# Patient Record
Sex: FEMALE | Race: BLACK OR AFRICAN AMERICAN | URBAN - METROPOLITAN AREA
[De-identification: names, ages, dates, MRNs, and addresses within clinical notes are randomized per-mention and may not be internally consistent; named-entity substitution may affect disease eponyms.]

---

## 2024-02-29 ENCOUNTER — OFFICE VISIT (OUTPATIENT)
Dept: OBGYN CLINIC | Facility: CLINIC | Age: 44
End: 2024-02-29
Payer: COMMERCIAL

## 2024-02-29 VITALS
BODY MASS INDEX: 30.34 KG/M2 | DIASTOLIC BLOOD PRESSURE: 78 MMHG | SYSTOLIC BLOOD PRESSURE: 120 MMHG | HEIGHT: 61 IN | WEIGHT: 160.69 LBS

## 2024-02-29 DIAGNOSIS — R39.9 URINARY SYMPTOM OR SIGN: ICD-10-CM

## 2024-02-29 DIAGNOSIS — Z92.0 HISTORY OF USE OF CONTRACEPTIVE INTRAUTERINE DEVICE (IUD): ICD-10-CM

## 2024-02-29 DIAGNOSIS — D21.9 FIBROIDS: ICD-10-CM

## 2024-02-29 DIAGNOSIS — Z01.419 WOMEN'S ANNUAL ROUTINE GYNECOLOGICAL EXAMINATION: ICD-10-CM

## 2024-02-29 DIAGNOSIS — Z12.31 BREAST CANCER SCREENING BY MAMMOGRAM: ICD-10-CM

## 2024-02-29 DIAGNOSIS — N92.4 EXCESSIVE BLEEDING IN PREMENOPAUSAL PERIOD: ICD-10-CM

## 2024-02-29 DIAGNOSIS — Z12.4 SCREENING FOR CERVICAL CANCER: Primary | ICD-10-CM

## 2024-02-29 LAB
APPEARANCE: CLEAR
BILIRUBIN: NEGATIVE
GLUCOSE (URINE DIPSTICK): NEGATIVE MG/DL
KETONES (URINE DIPSTICK): NEGATIVE MG/DL
LEUKOCYTES: NEGATIVE
MULTISTIX LOT#: ABNORMAL NUMERIC
NITRITE, URINE: NEGATIVE
PH, URINE: 6 (ref 4.5–8)
PROTEIN (URINE DIPSTICK): NEGATIVE MG/DL
SPECIFIC GRAVITY: 1.01 (ref 1–1.03)
URINE-COLOR: YELLOW
UROBILINOGEN,SEMI-QN: 0.2 MG/DL (ref 0–1.9)

## 2024-02-29 PROCEDURE — 99386 PREV VISIT NEW AGE 40-64: CPT | Performed by: OBSTETRICS & GYNECOLOGY

## 2024-02-29 PROCEDURE — 81002 URINALYSIS NONAUTO W/O SCOPE: CPT | Performed by: OBSTETRICS & GYNECOLOGY

## 2024-02-29 PROCEDURE — 99205 OFFICE O/P NEW HI 60 MIN: CPT | Performed by: OBSTETRICS & GYNECOLOGY

## 2024-02-29 PROCEDURE — 87624 HPV HI-RISK TYP POOLED RSLT: CPT | Performed by: OBSTETRICS & GYNECOLOGY

## 2024-02-29 PROCEDURE — 87086 URINE CULTURE/COLONY COUNT: CPT | Performed by: OBSTETRICS & GYNECOLOGY

## 2024-02-29 RX ORDER — SUMATRIPTAN 100 MG/1
100 TABLET, FILM COATED ORAL EVERY 2 HOUR PRN
Refills: 0 | Status: CANCELLED | OUTPATIENT
Start: 2024-02-29

## 2024-02-29 RX ORDER — ALBUTEROL SULFATE 90 UG/1
1 AEROSOL, METERED RESPIRATORY (INHALATION) AS DIRECTED
COMMUNITY

## 2024-02-29 RX ORDER — BUTALBITAL, ACETAMINOPHEN AND CAFFEINE 50; 325; 40 MG/1; MG/1; MG/1
TABLET ORAL
Refills: 0 | Status: CANCELLED | OUTPATIENT
Start: 2024-02-29

## 2024-02-29 RX ORDER — TRANEXAMIC ACID 650 MG/1
TABLET ORAL
Qty: 30 TABLET | Refills: 3 | Status: SHIPPED | OUTPATIENT
Start: 2024-02-29

## 2024-02-29 RX ORDER — SUMATRIPTAN 100 MG/1
100 TABLET, FILM COATED ORAL EVERY 2 HOUR PRN
COMMUNITY

## 2024-02-29 RX ORDER — BUTALBITAL, ACETAMINOPHEN AND CAFFEINE 50; 325; 40 MG/1; MG/1; MG/1
TABLET ORAL
COMMUNITY
Start: 2023-01-03

## 2024-02-29 NOTE — PROGRESS NOTES
NEW GYN H&P     2024  4:52 PM    Chief Complaint   Patient presents with    New Patient     Pt here for follow up from [facility] 2024    Annual     Annual exam/pap     Vaginal Bleeding     Pt c/o irregular periods last period - and -    Migraine     Pt c/o migraine headaches on periods    .    HPI: Patient is a 43 year old  LMP 23 here to establish care as ER follow-up also  due for PAP and annual exam. No gynecologic visit or PAP since  when IUD placed. Seen at [facility] 2024 for pelvic pain and heavy periods where underwent USN showing 24 cm enlarged uterus with multiple large fibroids measuring up to 8.1 cm. Counseled that no minimally invasive procedures or medications are an option for such large fibroids and that hysterectomy is the indicated treatment. Will check CBC today and prescribe TXA for heavy flow until able to schedule pre-op appointment with my partner Dr. Gaines. Fearful of exam and pain today but agrees to PAP and if possible, IUD removal.    Patient's last menstrual period was 2023.    OB History    Para Term  AB Living   1       1     SAB IAB Ectopic Multiple Live Births                  # Outcome Date GA Lbr Zeeshan/2nd Weight Sex Delivery Anes PTL Lv   1 AB                GYN hx:    Hx Prior Abnormal Pap: No  Pap Result Notes: per pt last pap done [id number]  Follow Up Recommendation: Mammo: Never  CONTRACEPTION: IUD  LAST MAMMOGRAM: Never      Current Outpatient Medications   Medication Sig Dispense Refill    albuterol 108 (90 Base) MCG/ACT Inhalation Aero Soln Inhale 1 puff into the lungs As Directed.      Levonorgestrel 20 MCG/DAY Intrauterine IUD 1 Intra Uterine Device by Intrauterine route one time.      SUMAtriptan Succinate 100 MG Oral Tab Take 1 tablet (100 mg total) by mouth every 2 (two) hours as needed for Migraine.      butalbital-acetaminophen-caffeine -40 MG Oral Tab TAKE 1 TABLET BY MOUTH EVERY 4 HOURS AS  NEEDED FOR PAIN (headaches) (Patient not taking: Reported on 2/29/2024)         Past Medical History:   Diagnosis Date    Fibroids 2/29/2024    History of use of contraceptive intrauterine device (IUD) 2014    Mirena IUD    Hypertension     Migraine      Past Surgical History:   Procedure Laterality Date    D & C  2014    EAB    INSERT INTRAUTERINE DEVICE  2014    Mirena     No Known Allergies  Family History   Problem Relation Age of Onset    Breast Cancer Neg     Ovarian Cancer Neg     Uterine Cancer Neg     Colon Cancer Neg      Social History     Socioeconomic History    Marital status: Single   Tobacco Use    Smoking status: Never    Smokeless tobacco: Never   Vaping Use    Vaping Use: Never used   Substance and Sexual Activity    Alcohol use: Yes    Drug use: Never    Sexual activity: Yes     Partners: Male     Birth control/protection: Mirena     Comment: MIrena IUD 2014/2015     Social History     Social History Narrative    Not on file       ROS:     Review of Systems:  A comprehensive 10 point ROS was completed. All pertinent positives and negatives noted in the HPI.     /78   Ht 61\"   Wt 160 lb 11.2 oz (72.9 kg)   LMP 01/14/2023   BMI 30.36 kg/m²     Exam:   GENERAL: well developed, well nourished, in no apparent distress  SKIN: no rashes, no lesions  HEENT: normal  LUNGS: respiration unlabored  CARDIOVASCULAR: no peripheral edema or varicosities, skin warm and dry  BREASTS: bilaterally nontender, no palpable masses, no nipple discharge, no skin changes, no axillary adenopathy  ABDOMEN: Uterine fundus at xiphoid process extending bilaterally under ribs  GYNE/:   External Genitalia: normal, no lesions  Urethra: meatus normal   Bladder: well supported  Vagina: extreme vaginismus, no visible lesions, tan discharge noted in vault  Uterus: 25 cm, globular extending to posterior vault, immobile  Cervix: unable to visualize os or IUD string, PAP brush applied posteriorally for sampling  Adnexa:  unable to palpate  Cul-de-sac: normal  R/V: normal perineum, no hemorrhoids  EXTREMITIES:  nontender without edema      A/P: Patient is 43 year old female     1. Women's annual routine gynecological examination  - PAP today  - Mammogram ordered    2. Fibroids 25 cm uterus  - Return for pre-op counseling with Dr. Gaines    3. Excessive bleeding in premenopausal period  - Check CBC  - TXA ordered    4. History of use of contraceptive intrauterine device (IUD)  - Unable to visualize  - For removal at time of hysterectomy        Total time spent = 60 minutes  >50% visit = face to face counseling and coordination of care        2/29/2024  Kristal Soto MD

## 2024-03-01 LAB — HPV I/H RISK 1 DNA SPEC QL NAA+PROBE: NEGATIVE

## 2024-04-03 ENCOUNTER — TELEPHONE (OUTPATIENT)
Dept: OBGYN CLINIC | Facility: CLINIC | Age: 44
End: 2024-04-03

## 2024-05-02 ENCOUNTER — TELEPHONE (OUTPATIENT)
Dept: OBGYN CLINIC | Facility: CLINIC | Age: 44
End: 2024-05-02

## 2024-07-16 RX ORDER — TRANEXAMIC ACID 650 MG/1
TABLET ORAL
Qty: 30 TABLET | Refills: 3 | Status: SHIPPED | OUTPATIENT
Start: 2024-07-16

## 2024-11-07 ENCOUNTER — PATIENT MESSAGE (OUTPATIENT)
Dept: OBGYN CLINIC | Facility: CLINIC | Age: 44
End: 2024-11-07

## 2024-11-08 RX ORDER — TRANEXAMIC ACID 650 MG/1
TABLET ORAL
Qty: 30 TABLET | Refills: 1 | Status: SHIPPED | OUTPATIENT
Start: 2024-11-08 | End: 2025-01-23

## 2024-12-05 ENCOUNTER — TELEPHONE (OUTPATIENT)
Dept: OBGYN CLINIC | Facility: CLINIC | Age: 44
End: 2024-12-05

## 2024-12-05 ENCOUNTER — OFFICE VISIT (OUTPATIENT)
Dept: OBGYN CLINIC | Facility: CLINIC | Age: 44
End: 2024-12-05
Payer: COMMERCIAL

## 2024-12-05 VITALS
HEIGHT: 61 IN | BODY MASS INDEX: 30.78 KG/M2 | SYSTOLIC BLOOD PRESSURE: 150 MMHG | WEIGHT: 163 LBS | DIASTOLIC BLOOD PRESSURE: 80 MMHG

## 2024-12-05 DIAGNOSIS — N92.4 EXCESSIVE BLEEDING IN PREMENOPAUSAL PERIOD: ICD-10-CM

## 2024-12-05 DIAGNOSIS — D21.9 FIBROIDS: Primary | ICD-10-CM

## 2024-12-05 DIAGNOSIS — D21.9 FIBROIDS: ICD-10-CM

## 2024-12-05 DIAGNOSIS — Z12.31 BREAST CANCER SCREENING BY MAMMOGRAM: Primary | ICD-10-CM

## 2024-12-05 PROBLEM — Z01.419 WOMEN'S ANNUAL ROUTINE GYNECOLOGICAL EXAMINATION: Status: RESOLVED | Noted: 2024-02-29 | Resolved: 2024-12-05

## 2024-12-05 PROCEDURE — 99214 OFFICE O/P EST MOD 30 MIN: CPT | Performed by: OBSTETRICS & GYNECOLOGY

## 2024-12-05 NOTE — TELEPHONE ENCOUNTER
Scheduled 1/23 1 pm    Please schedule the following surgery:    Procedure: 1/23/2025 abdominal hysterectomy, bilateral salpingectomy  Assist: (Y/N or none) yes, possible [place]yel.   Date:                                      Time Requested:   Dx:  Pre-op appt: (Y/N or n/a)  Admission type: (IN/OUT/OBVS)  Department of discharge(SDS/Floor):  Expected length of stay:  Procedure length time (please enter amount you are requesting):  Recovery time (patients always ask):  Medical Clearance: (Y/N)  Post- Op f/u appt time frame:     Pre-op orders (choose one)   X Use [facility] procedure driven order set in addition to anesthesia protocol   Use [facility] surgeon's personalized order set   Surgeon to enter pre-op orders   No pre-op orders   Use the prophylactic antibiotic protocol   No pre-op antibiotic orders indicated do not give antibiotic, if any, listed on the procedure driven order sets or personalized order sets    PCN allergy Yes___ or No___   If Yes: Proceed with PCN/Cephalosporin recommended antibiotic as benefit outweighs risk     Proceed with PCN/Cephalosporin recommended antibiotic as not a true allergy    Proceed with recommended alternative antibiotic for PCN allergy        ALL Medicaid/including BCBS community: Tubal/ Hyst form MUST be signed (30 days):      Message to nurses:

## 2024-12-05 NOTE — PROGRESS NOTES
GYN H&P     2024  11:10 AM    CC: Patient is here for fibroid uterus    HPI: Patient is a 44 year old  for fibroid uterus    Has periods which are long, heavy, passing clots, anemia \"for years.\" She has been delaying getting surgery for years. Some cramping with periods. No endometrial biopsy      Patient's last menstrual period was 2024.    OB History    Para Term  AB Living   1       1     SAB IAB Ectopic Multiple Live Births                  # Outcome Date GA Lbr Zeeshan/2nd Weight Sex Type Anes PTL Lv   1 AB                GYN hx:             Past Medical History:    Fibroids    Hypertension    Migraine     Past Surgical History:   Procedure Laterality Date    D & c      EAB    Insert intrauterine device      Mirena     Allergies[1]  Family History   Problem Relation Age of Onset    No Known Problems Mother     No Known Problems Father     No Known Problems Sister     High Blood Pressure Maternal Grandmother     Heart Disease Maternal Grandmother     Kidney Disease Maternal Grandfather         dialysis    High Blood Pressure Maternal Grandfather     Breast Cancer Neg     Ovarian Cancer Neg     Uterine Cancer Neg     Colon Cancer Neg      Social History     Socioeconomic History    Marital status: Single   Occupational History    Occupation: works from home as    Tobacco Use    Smoking status: Never    Smokeless tobacco: Never   Vaping Use    Vaping status: Never Used   Substance and Sexual Activity    Alcohol use: Yes    Drug use: Never    Sexual activity: Yes     Partners: Male     Birth control/protection: Mirena     Comment: MIrena IUD      Social History     Social History Narrative    Lives with mother and GM, feels safe    No hx of abuse       Medications reviewed. See active list.     /80   Ht 61\"   Wt 163 lb (73.9 kg)   LMP 2024   BMI 30.80 kg/m²       Exam:   GENERAL: well developed, well nourished, in no apparent  distress  SKIN: no rashes, no suspicious lesions  HEENT: normal  NECK: supple; no thyroidmegaly, no adenopathy  BREASTS: symmetrical, nontender, no palpable masses or nodes, no nipple discharge, no skin changes, no dippling, no palpable axillary adenopathy  ABDOMEN: large abdominal mass to ribs  Liver and spleen non-tender, no enlargement. No palpable hernias  GYNE/:  External Genitalia: Normal appearing, no lesions, normal hair distribution   Urethral meatus appear wnl, no abnormal discharge or lesions noted.   Bladder: well supported, urethra wnl, no palpable tenderness or masses, no discharge  Vagina: normal pink mucosa, no lesions, normal clear discharge.   Uterus: 28 W size fibroid uterus  Cervix: pink, no lesions grossly visible, no discharge  Adnexa: non tender, no palpable masses, normal size  Anus:  No lesions or visible hemorrhoids  Impression    1. Limited evaluation due to patient declining transvaginal approach, and multiple uterine fibroids obscuring view of adnexa.  2. Multiple uterine fibroids.  3. Bilateral ovaries were not assessed, due to above limitations.    ATTENDING ADDENDUM: I agree with the above report with the following additions. As above, the uterus is markedly enlarged with numerous uterine fibroids, correlating with findings on recent CT. The markedly enlarged uterus and fibroids contribute to the limited visualization given poor acoustic windows. Absence of transvaginal imaging also limits evaluation. In the region of the mid body/lower uterine segment, a linear, echogenic, shadowing structure is present, correlating with the patient's known intrauterine device. Evaluation of the endometrium and localization of the intrauterine device is somewhat limited given poor acoustic windows and distortion from the numerous fibroids. As above, the ovaries are not discretely visualized. Further evaluation based on clinical findings, which could include an MRI of the pelvis with and without IV  contrast, if further characterization is desired/warranted. Other findings as above.    I, Dr. Jesus Egan, have personally reviewed this report and concur with its findings and conclusions, as affirmed by my electronic signature.    Resident/Fellow: Efrain Doss on 01/29/2024 2:33 PM    Attending: Jesus Egan on 01/29/2024 3:04 PM  Narrative    EXAM:  US PELVIS TRANSABDOMINAL NON-OB COMPLETE; US TRANSVAGINAL; VAS DUPLEX ABDOMEN, PELVIS, SCROTUM, OR RETROPERITONEUM COMPLETE,  1/29/2024 1:40 pm; 1/29/2024 1:41 pm    HISTORY:  Pelvic pain.    COMPARISON:  Limited comparison with recent CT abdomen and pelvis    TECHNIQUE:  Multiple sonographic images were obtained of the pelvis via transabdominal approaches. A duplex Doppler study was performed consisting of integrated two-dimensional real-time imaging with color arterial and venous flow Doppler and Doppler spectral analysis utilizing high frequency linear array probe. Permanent images were obtained.    FINDINGS:  Limited evaluation as patient declined transvaginal exam, and multiple uterine fibroids obscuring view of the bilateral adnexa.    Transabdominal imaging demonstrate the anteverted uterus is heterogenous and enlarged measuring 24.0 X 9.9 x 1.7 cm. Multiple uterine fibroids, as follows:  Pedunculated fundal fibroid measuring 6.5 x 7.2 x 6.1 cm.  Intramural right lateral wall fibroid measuring 8.0 x 8.1 x 7.9 cm.  Intramural left lateral wall fibroid measuring 6.1 x 5.1 x 4.5 cm.  Intramural posterior fibroid measuring 4.5 x 4.4 x 4.2 cm    Urinary bladder is underdistended and unremarkable.    A portion of the endometrium is visualized measuring 6-7 mm in thickness. There is no evidence of free fluid within the posterior cul-de-sac.    The bilateral ovaries were not visualized, likely related to poor acoustic windows/shadowing.  Procedure Note    Jesus Egan MD - 01/29/2024  Formatting of this note might be different from the  original.  EXAM:  US PELVIS TRANSABDOMINAL NON-OB COMPLETE; US TRANSVAGINAL; VAS DUPLEX ABDOMEN, PELVIS, SCROTUM, OR RETROPERITONEUM COMPLETE,  1/29/2024 1:40 pm; 1/29/2024 1:41 pm    HISTORY:  Pelvic pain.    COMPARISON:  Limited comparison with recent CT abdomen and pelvis    TECHNIQUE:  Multiple sonographic images were obtained of the pelvis via transabdominal approaches. A duplex Doppler study was performed consisting of integrated two-dimensional real-time imaging with color arterial and venous flow Doppler and Doppler spectral analysis utilizing high frequency linear array probe. Permanent images were obtained.    FINDINGS:  Limited evaluation as patient declined transvaginal exam, and multiple uterine fibroids obscuring view of the bilateral adnexa.    Transabdominal imaging demonstrate the anteverted uterus is heterogenous and enlarged measuring 24.0 X 9.9 x 1.7 cm. Multiple uterine fibroids, as follows:  Pedunculated fundal fibroid measuring 6.5 x 7.2 x 6.1 cm.  Intramural right lateral wall fibroid measuring 8.0 x 8.1 x 7.9 cm.  Intramural left lateral wall fibroid measuring 6.1 x 5.1 x 4.5 cm.  Intramural posterior fibroid measuring 4.5 x 4.4 x 4.2 cm    Urinary bladder is underdistended and unremarkable.    A portion of the endometrium is visualized measuring 6-7 mm in thickness. There is no evidence of free fluid within the posterior cul-de-sac.    The bilateral ovaries were not visualized, likely related to poor acoustic windows/shadowing.    IMPRESSION:  1. Limited evaluation due to patient declining transvaginal approach, and multiple uterine fibroids obscuring view of adnexa.  2. Multiple uterine fibroids.  3. Bilateral ovaries were not assessed, due to above limitations.    ATTENDING ADDENDUM: I agree with the above report with the following additions. As above, the uterus is markedly enlarged with numerous uterine fibroids, correlating with findings on recent CT. The markedly enlarged uterus and  fibroids contribute to the limited visualization given poor acoustic windows. Absence of transvaginal imaging also limits evaluation. In the region of the mid body/lower uterine segment, a linear, echogenic, shadowing structure is present, correlating with the patient's known intrauterine device. Evaluation of the endometrium and localization of the intrauterine device is somewhat limited given poor acoustic windows and distortion from the numerous fibroids. As above, the ovaries are not discretely visualized. Further evaluation based on clinical findings, which could include an MRI of the pelvis with and without IV contrast, if further characterization is desired/warranted. Other findings as above.    I, Dr. Jesus Egan, have personally reviewed this report and concur with its findings and conclusions, as affirmed by my electronic signature.    Resident/Fellow: Efrain Doss on 01/29/2024 2:33 PM    Attending: Jesus Egan on 01/29/2024 3:04 PM  Exam End: -- Last Resulted: 01/29/24  3:05 PM   Received From: [facility]  Result Received: 12/05/24 11:06 AM    View Encounter    US Pelvis Complete    Anatomical Region Laterality Modality   Pelvis -- Ultrasound     Impression    1. Limited evaluation due to patient declining transvaginal approach, and multiple uterine fibroids obscuring view of adnexa.  2. Multiple uterine fibroids.  3. Bilateral ovaries were not assessed, due to above limitations.    ATTENDING ADDENDUM: I agree with the above report with the following additions. As above, the uterus is markedly enlarged with numerous uterine fibroids, correlating with findings on recent CT. The markedly enlarged uterus and fibroids contribute to the limited visualization given poor acoustic windows. Absence of transvaginal imaging also limits evaluation. In the region of the mid body/lower uterine segment, a linear, echogenic, shadowing structure is present, correlating with the patient's  known intrauterine device. Evaluation of the endometrium and localization of the intrauterine device is somewhat limited given poor acoustic windows and distortion from the numerous fibroids. As above, the ovaries are not discretely visualized. Further evaluation based on clinical findings, which could include an MRI of the pelvis with and without IV contrast, if further characterization is desired/warranted. Other findings as above.    I, Dr. Jesus Egan, have personally reviewed this report and concur with its findings and conclusions, as affirmed by my electronic signature.    Resident/Fellow: Efrain Doss on 01/29/2024 2:33 PM    Attending: Jesus Egan on 01/29/2024 3:04 PM  Narrative    EXAM:  US PELVIS TRANSABDOMINAL NON-OB COMPLETE; US TRANSVAGINAL; VAS DUPLEX ABDOMEN, PELVIS, SCROTUM, OR RETROPERITONEUM COMPLETE,  1/29/2024 1:40 pm; 1/29/2024 1:41 pm    HISTORY:  Pelvic pain.    COMPARISON:  Limited comparison with recent CT abdomen and pelvis    TECHNIQUE:  Multiple sonographic images were obtained of the pelvis via transabdominal approaches. A duplex Doppler study was performed consisting of integrated two-dimensional real-time imaging with color arterial and venous flow Doppler and Doppler spectral analysis utilizing high frequency linear array probe. Permanent images were obtained.    FINDINGS:  Limited evaluation as patient declined transvaginal exam, and multiple uterine fibroids obscuring view of the bilateral adnexa.    Transabdominal imaging demonstrate the anteverted uterus is heterogenous and enlarged measuring 24.0 X 9.9 x 1.7 cm. Multiple uterine fibroids, as follows:  Pedunculated fundal fibroid measuring 6.5 x 7.2 x 6.1 cm.  Intramural right lateral wall fibroid measuring 8.0 x 8.1 x 7.9 cm.  Intramural left lateral wall fibroid measuring 6.1 x 5.1 x 4.5 cm.  Intramural posterior fibroid measuring 4.5 x 4.4 x 4.2 cm    Urinary bladder is underdistended and unremarkable.    A portion  of the endometrium is visualized measuring 6-7 mm in thickness. There is no evidence of free fluid within the posterior cul-de-sac.    The bilateral ovaries were not visualized, likely related to poor acoustic windows/shadowing.  Procedure Note    Jesus Egan MD - 01/29/2024  Formatting of this note might be different from the original.  EXAM:  US PELVIS TRANSABDOMINAL NON-OB COMPLETE; US TRANSVAGINAL; VAS DUPLEX ABDOMEN, PELVIS, SCROTUM, OR RETROPERITONEUM COMPLETE,  1/29/2024 1:40 pm; 1/29/2024 1:41 pm    HISTORY:  Pelvic pain.    COMPARISON:  Limited comparison with recent CT abdomen and pelvis    TECHNIQUE:  Multiple sonographic images were obtained of the pelvis via transabdominal approaches. A duplex Doppler study was performed consisting of integrated two-dimensional real-time imaging with color arterial and venous flow Doppler and Doppler spectral analysis utilizing high frequency linear array probe. Permanent images were obtained.    FINDINGS:  Limited evaluation as patient declined transvaginal exam, and multiple uterine fibroids obscuring view of the bilateral adnexa.    Transabdominal imaging demonstrate the anteverted uterus is heterogenous and enlarged measuring 24.0 X 9.9 x 1.7 cm. Multiple uterine fibroids, as follows:  Pedunculated fundal fibroid measuring 6.5 x 7.2 x 6.1 cm.  Intramural right lateral wall fibroid measuring 8.0 x 8.1 x 7.9 cm.  Intramural left lateral wall fibroid measuring 6.1 x 5.1 x 4.5 cm.  Intramural posterior fibroid measuring 4.5 x 4.4 x 4.2 cm    Urinary bladder is underdistended and unremarkable.    A portion of the endometrium is visualized measuring 6-7 mm in thickness. There is no evidence of free fluid within the posterior cul-de-sac.    The bilateral ovaries were not visualized, likely related to poor acoustic windows/shadowing.    IMPRESSION:  1. Limited evaluation due to patient declining transvaginal approach, and multiple uterine fibroids obscuring  view of adnexa.  2. Multiple uterine fibroids.  3. Bilateral ovaries were not assessed, due to above limitations.    ATTENDING ADDENDUM: I agree with the above report with the following additions. As above, the uterus is markedly enlarged with numerous uterine fibroids, correlating with findings on recent CT. The markedly enlarged uterus and fibroids contribute to the limited visualization given poor acoustic windows. Absence of transvaginal imaging also limits evaluation. In the region of the mid body/lower uterine segment, a linear, echogenic, shadowing structure is present, correlating with the patient's known intrauterine device. Evaluation of the endometrium and localization of the intrauterine device is somewhat limited given poor acoustic windows and distortion from the numerous fibroids. As above, the ovaries are not discretely visualized. Further evaluation based on clinical findings, which could include an MRI of the pelvis with and without IV contrast, if further characterization is desired/warranted. Other findings as above.    I, Dr. Jesus Egan, have personally reviewed this report and concur with its findings and conclusions, as affirmed by my electronic signature.    Resident/Fellow: Efrain Doss on 01/29/2024 2:33 PM    Attending: Jesus Egan on 01/29/2024 3:04 PM  Exam End: 01/29/24  2:22 PM Last Resulted: 01/29/24  3:05 PM   Received From: [facility]  Result Received: 02/29/24  3:30 PM          A/P: Patient is 44 year old female     1. Breast cancer screening by mammogram  - [facility] DULCE 2D+3D SCREENING BILAT (CPT=77067/49827); Future    2. Fibroids  - CBC, Platelet; No Differential; Future  - Type and screen    3. Excessive bleeding in premenopausal period    Plan 1/23/2025 TAHbilateral salpingectomy I discussed with the patient  the procedure including the preop/procedure/postop course and the risks of  bleeding, infection, damage to internal organs.  All questions were  answered, and written information was given to the pt regarding the procedure.    Nataliia Sheehan MD              [1] No Known Allergies

## 2024-12-27 ENCOUNTER — TELEPHONE (OUTPATIENT)
Dept: OBGYN CLINIC | Facility: CLINIC | Age: 44
End: 2024-12-27

## 2025-01-16 ENCOUNTER — TELEPHONE (OUTPATIENT)
Dept: OBGYN CLINIC | Facility: CLINIC | Age: 45
End: 2025-01-16

## 2025-01-16 NOTE — TELEPHONE ENCOUNTER
Patient calling to check to see if [employer] has sent us any disability forms. Upon checking unable to locate any---patient was not sure when it was sent. Advised patient to resent. Patient sent forms via email. Advised patient of turn around time 10-15 business days. Logged for processing. Details obtained. Valid outside Release of Information with the form.     Type of Leave: Continuous Short term disability   Reason for Leave: Abdominal hysterectomy   Start date of leave: 01/22/25  End date of leave: 03/06/25 6 weeks   How many flare ups per month/length?:  How many appts per month/length?:   Was Fee and Turnaround info Given?: Yes

## 2025-01-17 ENCOUNTER — LAB ENCOUNTER (OUTPATIENT)
Dept: LAB | Facility: REFERENCE LAB | Age: 45
End: 2025-01-17
Attending: OBSTETRICS & GYNECOLOGY
Payer: COMMERCIAL

## 2025-01-17 ENCOUNTER — TELEPHONE (OUTPATIENT)
Dept: OBGYN CLINIC | Facility: CLINIC | Age: 45
End: 2025-01-17

## 2025-01-17 ENCOUNTER — PATIENT MESSAGE (OUTPATIENT)
Dept: OBGYN CLINIC | Facility: CLINIC | Age: 45
End: 2025-01-17

## 2025-01-17 DIAGNOSIS — D21.9 FIBROIDS: ICD-10-CM

## 2025-01-17 LAB
ANTIBODY SCREEN: NEGATIVE
DEPRECATED RDW RBC AUTO: 47.4 FL (ref 35.1–46.3)
ERYTHROCYTE [DISTWIDTH] IN BLOOD BY AUTOMATED COUNT: 16.8 % (ref 11–15)
HCT VFR BLD AUTO: 29.4 %
HGB BLD-MCNC: 9.2 G/DL
MCH RBC QN AUTO: 24.6 PG (ref 26–34)
MCHC RBC AUTO-ENTMCNC: 31.3 G/DL (ref 31–37)
MCV RBC AUTO: 78.6 FL
PLATELET # BLD AUTO: 563 10(3)UL (ref 150–450)
RBC # BLD AUTO: 3.74 X10(6)UL
RH BLOOD TYPE: POSITIVE
WBC # BLD AUTO: 7.6 X10(3) UL (ref 4–11)

## 2025-01-17 PROCEDURE — 86900 BLOOD TYPING SEROLOGIC ABO: CPT | Performed by: OBSTETRICS & GYNECOLOGY

## 2025-01-17 PROCEDURE — 85027 COMPLETE CBC AUTOMATED: CPT

## 2025-01-17 PROCEDURE — 86850 RBC ANTIBODY SCREEN: CPT | Performed by: OBSTETRICS & GYNECOLOGY

## 2025-01-17 PROCEDURE — 36415 COLL VENOUS BLD VENIPUNCTURE: CPT | Performed by: OBSTETRICS & GYNECOLOGY

## 2025-01-17 PROCEDURE — 86901 BLOOD TYPING SEROLOGIC RH(D): CPT | Performed by: OBSTETRICS & GYNECOLOGY

## 2025-01-17 NOTE — TELEPHONE ENCOUNTER
Called and spoke with [facility] rep to review the in patient stay, approval stated its for 1 day but I requested for 2.     Per the rep, she stated that she will be covered if she says 2 or 3 days.    Ref number [id number]

## 2025-01-20 NOTE — TELEPHONE ENCOUNTER
Dr. Sheehan    Please sign off on form if you agree to: disability for surgery    -Signature page will be the first page scanned  -From your Inbasket, Highlight the patient and click Chart   -Double click the 01/16/25 Forms Completion telephone encounter  -Scroll down to the Media section   -Click the blue Hyperlink: disability Dr. Sheehan 1/20/25  -Click Acknowledge located in the top right ribbon/menu   -Drag the mouse into the blank space of the document and a + sign will appear. Left click to   electronically sign the document.  -Once signed, simply exit out of the screen and you signature will be saved.     Thank you,  Janessa

## 2025-01-21 RX ORDER — AMLODIPINE BESYLATE 10 MG/1
TABLET ORAL DAILY
COMMUNITY
Start: 2024-12-31

## 2025-01-21 RX ORDER — LOSARTAN POTASSIUM AND HYDROCHLOROTHIAZIDE 25; 100 MG/1; MG/1
TABLET ORAL DAILY
COMMUNITY
Start: 2024-12-31

## 2025-01-21 RX ORDER — FAMOTIDINE 40 MG/1
40 TABLET, FILM COATED ORAL DAILY
COMMUNITY

## 2025-01-23 ENCOUNTER — ANESTHESIA EVENT (OUTPATIENT)
Dept: SURGERY | Facility: HOSPITAL | Age: 45
End: 2025-01-23
Payer: COMMERCIAL

## 2025-01-23 ENCOUNTER — HOSPITAL ENCOUNTER (INPATIENT)
Facility: HOSPITAL | Age: 45
LOS: 3 days | Discharge: HOME | DRG: 742 | End: 2025-01-26
Attending: OBSTETRICS & GYNECOLOGY | Admitting: OBSTETRICS & GYNECOLOGY
Payer: COMMERCIAL

## 2025-01-23 ENCOUNTER — HOSPITAL ENCOUNTER (INPATIENT)
Facility: HOSPITAL | Age: 45
LOS: 3 days | Discharge: HOME | End: 2025-01-26
Attending: OBSTETRICS & GYNECOLOGY | Admitting: OBSTETRICS & GYNECOLOGY
Payer: COMMERCIAL

## 2025-01-23 ENCOUNTER — ANESTHESIA (OUTPATIENT)
Dept: SURGERY | Facility: HOSPITAL | Age: 45
End: 2025-01-23
Payer: COMMERCIAL

## 2025-01-23 DIAGNOSIS — D21.9 FIBROIDS: ICD-10-CM

## 2025-01-23 DIAGNOSIS — N92.4 EXCESSIVE BLEEDING IN PREMENOPAUSAL PERIOD: ICD-10-CM

## 2025-01-23 DIAGNOSIS — Z48.89 ENCOUNTER FOR POSTOPERATIVE CARE: Primary | ICD-10-CM

## 2025-01-23 PROBLEM — I10 ESSENTIAL HYPERTENSION: Status: ACTIVE | Noted: 2025-01-23

## 2025-01-23 PROBLEM — D25.1 INTRAMURAL AND SUBSEROUS LEIOMYOMA OF UTERUS: Status: ACTIVE | Noted: 2025-01-23

## 2025-01-23 PROBLEM — D25.2 INTRAMURAL AND SUBSEROUS LEIOMYOMA OF UTERUS: Status: ACTIVE | Noted: 2025-01-23

## 2025-01-23 LAB
ANION GAP SERPL CALC-SCNC: 9 MMOL/L (ref 0–18)
B-HCG UR QL: NEGATIVE
BUN BLD-MCNC: 11 MG/DL (ref 9–23)
BUN/CREAT SERPL: 11.6 (ref 10–20)
CALCIUM BLD-MCNC: 9.7 MG/DL (ref 8.7–10.4)
CHLORIDE SERPL-SCNC: 107 MMOL/L (ref 98–112)
CO2 SERPL-SCNC: 26 MMOL/L (ref 21–32)
CREAT BLD-MCNC: 0.95 MG/DL
EGFRCR SERPLBLD CKD-EPI 2021: 77 ML/MIN/1.73M2 (ref 60–?)
GLUCOSE BLD-MCNC: 97 MG/DL (ref 70–99)
OSMOLALITY SERPL CALC.SUM OF ELEC: 293 MOSM/KG (ref 275–295)
POTASSIUM SERPL-SCNC: 3.6 MMOL/L (ref 3.5–5.1)
RH BLOOD TYPE: POSITIVE
SODIUM SERPL-SCNC: 142 MMOL/L (ref 136–145)

## 2025-01-23 PROCEDURE — 0UT70ZZ RESECTION OF BILATERAL FALLOPIAN TUBES, OPEN APPROACH: ICD-10-PCS | Performed by: OBSTETRICS & GYNECOLOGY

## 2025-01-23 PROCEDURE — 58150 TOTAL HYSTERECTOMY: CPT | Performed by: OBSTETRICS & GYNECOLOGY

## 2025-01-23 PROCEDURE — 0UT90ZZ RESECTION OF UTERUS, OPEN APPROACH: ICD-10-PCS | Performed by: OBSTETRICS & GYNECOLOGY

## 2025-01-23 PROCEDURE — 99222 1ST HOSP IP/OBS MODERATE 55: CPT | Performed by: HOSPITALIST

## 2025-01-23 RX ORDER — PHENYLEPHRINE HCL 10 MG/ML
VIAL (ML) INJECTION AS NEEDED
Status: DISCONTINUED | OUTPATIENT
Start: 2025-01-23 | End: 2025-01-24 | Stop reason: SURG

## 2025-01-23 RX ORDER — METOCLOPRAMIDE 10 MG/1
10 TABLET ORAL ONCE
Status: COMPLETED | OUTPATIENT
Start: 2025-01-23 | End: 2025-01-23

## 2025-01-23 RX ORDER — AMLODIPINE BESYLATE 10 MG/1
10 TABLET ORAL DAILY
Status: DISCONTINUED | OUTPATIENT
Start: 2025-01-24 | End: 2025-01-26

## 2025-01-23 RX ORDER — HYDRALAZINE HYDROCHLORIDE 20 MG/ML
5 INJECTION INTRAMUSCULAR; INTRAVENOUS EVERY 10 MIN PRN
Status: DISCONTINUED | OUTPATIENT
Start: 2025-01-23 | End: 2025-01-23 | Stop reason: HOSPADM

## 2025-01-23 RX ORDER — ONDANSETRON 4 MG/1
4 TABLET, FILM COATED ORAL EVERY 8 HOURS PRN
Status: DISCONTINUED | OUTPATIENT
Start: 2025-01-23 | End: 2025-01-26

## 2025-01-23 RX ORDER — HYDROMORPHONE HYDROCHLORIDE 1 MG/ML
0.8 INJECTION, SOLUTION INTRAMUSCULAR; INTRAVENOUS; SUBCUTANEOUS EVERY 2 HOUR PRN
Status: DISCONTINUED | OUTPATIENT
Start: 2025-01-23 | End: 2025-01-24

## 2025-01-23 RX ORDER — ALBUTEROL SULFATE 0.83 MG/ML
2.5 SOLUTION RESPIRATORY (INHALATION) AS NEEDED
Status: DISCONTINUED | OUTPATIENT
Start: 2025-01-23 | End: 2025-01-23 | Stop reason: HOSPADM

## 2025-01-23 RX ORDER — FAMOTIDINE 10 MG/ML
20 INJECTION, SOLUTION INTRAVENOUS ONCE
Status: COMPLETED | OUTPATIENT
Start: 2025-01-23 | End: 2025-01-23

## 2025-01-23 RX ORDER — SODIUM CHLORIDE, SODIUM LACTATE, POTASSIUM CHLORIDE, CALCIUM CHLORIDE 600; 310; 30; 20 MG/100ML; MG/100ML; MG/100ML; MG/100ML
INJECTION, SOLUTION INTRAVENOUS CONTINUOUS
Status: DISCONTINUED | OUTPATIENT
Start: 2025-01-23 | End: 2025-01-23 | Stop reason: HOSPADM

## 2025-01-23 RX ORDER — DEXAMETHASONE SODIUM PHOSPHATE 4 MG/ML
VIAL (ML) INJECTION AS NEEDED
Status: DISCONTINUED | OUTPATIENT
Start: 2025-01-23 | End: 2025-01-24 | Stop reason: SURG

## 2025-01-23 RX ORDER — HYDROMORPHONE HYDROCHLORIDE 1 MG/ML
0.2 INJECTION, SOLUTION INTRAMUSCULAR; INTRAVENOUS; SUBCUTANEOUS EVERY 5 MIN PRN
Status: DISCONTINUED | OUTPATIENT
Start: 2025-01-23 | End: 2025-01-23 | Stop reason: HOSPADM

## 2025-01-23 RX ORDER — ESMOLOL HYDROCHLORIDE 10 MG/ML
INJECTION INTRAVENOUS AS NEEDED
Status: DISCONTINUED | OUTPATIENT
Start: 2025-01-23 | End: 2025-01-24 | Stop reason: SURG

## 2025-01-23 RX ORDER — MORPHINE SULFATE 4 MG/ML
2 INJECTION, SOLUTION INTRAMUSCULAR; INTRAVENOUS EVERY 10 MIN PRN
Status: DISCONTINUED | OUTPATIENT
Start: 2025-01-23 | End: 2025-01-23 | Stop reason: HOSPADM

## 2025-01-23 RX ORDER — FAMOTIDINE 20 MG/1
20 TABLET, FILM COATED ORAL ONCE
Status: COMPLETED | OUTPATIENT
Start: 2025-01-23 | End: 2025-01-23

## 2025-01-23 RX ORDER — ONDANSETRON 2 MG/ML
4 INJECTION INTRAMUSCULAR; INTRAVENOUS EVERY 8 HOURS PRN
Status: DISCONTINUED | OUTPATIENT
Start: 2025-01-23 | End: 2025-01-23

## 2025-01-23 RX ORDER — HYDROMORPHONE HYDROCHLORIDE 1 MG/ML
0.4 INJECTION, SOLUTION INTRAMUSCULAR; INTRAVENOUS; SUBCUTANEOUS EVERY 5 MIN PRN
Status: DISCONTINUED | OUTPATIENT
Start: 2025-01-23 | End: 2025-01-23 | Stop reason: HOSPADM

## 2025-01-23 RX ORDER — NALOXONE HYDROCHLORIDE 0.4 MG/ML
0.08 INJECTION, SOLUTION INTRAMUSCULAR; INTRAVENOUS; SUBCUTANEOUS AS NEEDED
Status: DISCONTINUED | OUTPATIENT
Start: 2025-01-23 | End: 2025-01-23 | Stop reason: HOSPADM

## 2025-01-23 RX ORDER — METOCLOPRAMIDE HYDROCHLORIDE 5 MG/5ML
10 SOLUTION ORAL EVERY 6 HOURS PRN
Status: DISCONTINUED | OUTPATIENT
Start: 2025-01-23 | End: 2025-01-26

## 2025-01-23 RX ORDER — DEXTROSE, SODIUM CHLORIDE, SODIUM LACTATE, POTASSIUM CHLORIDE, AND CALCIUM CHLORIDE 5; .6; .31; .03; .02 G/100ML; G/100ML; G/100ML; G/100ML; G/100ML
INJECTION, SOLUTION INTRAVENOUS CONTINUOUS
Status: DISCONTINUED | OUTPATIENT
Start: 2025-01-23 | End: 2025-01-26

## 2025-01-23 RX ORDER — SODIUM CHLORIDE, SODIUM LACTATE, POTASSIUM CHLORIDE, CALCIUM CHLORIDE 600; 310; 30; 20 MG/100ML; MG/100ML; MG/100ML; MG/100ML
INJECTION, SOLUTION INTRAVENOUS CONTINUOUS
Status: DISCONTINUED | OUTPATIENT
Start: 2025-01-23 | End: 2025-01-26

## 2025-01-23 RX ORDER — ONDANSETRON 2 MG/ML
4 INJECTION INTRAMUSCULAR; INTRAVENOUS EVERY 6 HOURS PRN
Status: DISCONTINUED | OUTPATIENT
Start: 2025-01-23 | End: 2025-01-23 | Stop reason: HOSPADM

## 2025-01-23 RX ORDER — METOCLOPRAMIDE HYDROCHLORIDE 5 MG/ML
10 INJECTION INTRAMUSCULAR; INTRAVENOUS ONCE
Status: COMPLETED | OUTPATIENT
Start: 2025-01-23 | End: 2025-01-23

## 2025-01-23 RX ORDER — OXYCODONE HYDROCHLORIDE 5 MG/1
10 TABLET ORAL EVERY 4 HOURS PRN
Status: DISCONTINUED | OUTPATIENT
Start: 2025-01-23 | End: 2025-01-26

## 2025-01-23 RX ORDER — HYDROMORPHONE HYDROCHLORIDE 1 MG/ML
0.4 INJECTION, SOLUTION INTRAMUSCULAR; INTRAVENOUS; SUBCUTANEOUS EVERY 2 HOUR PRN
Status: DISCONTINUED | OUTPATIENT
Start: 2025-01-23 | End: 2025-01-24

## 2025-01-23 RX ORDER — HYDROMORPHONE HYDROCHLORIDE 1 MG/ML
0.6 INJECTION, SOLUTION INTRAMUSCULAR; INTRAVENOUS; SUBCUTANEOUS EVERY 5 MIN PRN
Status: DISCONTINUED | OUTPATIENT
Start: 2025-01-23 | End: 2025-01-23 | Stop reason: HOSPADM

## 2025-01-23 RX ORDER — ROCURONIUM BROMIDE 10 MG/ML
INJECTION, SOLUTION INTRAVENOUS AS NEEDED
Status: DISCONTINUED | OUTPATIENT
Start: 2025-01-23 | End: 2025-01-24 | Stop reason: SURG

## 2025-01-23 RX ORDER — ONDANSETRON 2 MG/ML
INJECTION INTRAMUSCULAR; INTRAVENOUS AS NEEDED
Status: DISCONTINUED | OUTPATIENT
Start: 2025-01-23 | End: 2025-01-24 | Stop reason: SURG

## 2025-01-23 RX ORDER — MORPHINE SULFATE 4 MG/ML
4 INJECTION, SOLUTION INTRAMUSCULAR; INTRAVENOUS EVERY 10 MIN PRN
Status: DISCONTINUED | OUTPATIENT
Start: 2025-01-23 | End: 2025-01-23 | Stop reason: HOSPADM

## 2025-01-23 RX ORDER — LIDOCAINE HYDROCHLORIDE 10 MG/ML
INJECTION, SOLUTION EPIDURAL; INFILTRATION; INTRACAUDAL; PERINEURAL AS NEEDED
Status: DISCONTINUED | OUTPATIENT
Start: 2025-01-23 | End: 2025-01-24 | Stop reason: SURG

## 2025-01-23 RX ORDER — ONDANSETRON 2 MG/ML
4 INJECTION INTRAMUSCULAR; INTRAVENOUS EVERY 4 HOURS PRN
Status: DISCONTINUED | OUTPATIENT
Start: 2025-01-23 | End: 2025-01-26

## 2025-01-23 RX ORDER — FAMOTIDINE 20 MG/1
40 TABLET, FILM COATED ORAL DAILY
Status: DISCONTINUED | OUTPATIENT
Start: 2025-01-24 | End: 2025-01-26

## 2025-01-23 RX ORDER — ONDANSETRON 4 MG/1
4 TABLET, FILM COATED ORAL EVERY 8 HOURS PRN
Status: DISCONTINUED | OUTPATIENT
Start: 2025-01-23 | End: 2025-01-23

## 2025-01-23 RX ORDER — ACETAMINOPHEN 500 MG
1000 TABLET ORAL ONCE
Status: COMPLETED | OUTPATIENT
Start: 2025-01-23 | End: 2025-01-23

## 2025-01-23 RX ORDER — KETOROLAC TROMETHAMINE 15 MG/ML
15 INJECTION, SOLUTION INTRAMUSCULAR; INTRAVENOUS EVERY 8 HOURS PRN
Status: DISPENSED | OUTPATIENT
Start: 2025-01-23 | End: 2025-01-25

## 2025-01-23 RX ORDER — MORPHINE SULFATE 10 MG/ML
6 INJECTION, SOLUTION INTRAMUSCULAR; INTRAVENOUS EVERY 10 MIN PRN
Status: DISCONTINUED | OUTPATIENT
Start: 2025-01-23 | End: 2025-01-23 | Stop reason: HOSPADM

## 2025-01-23 RX ORDER — OXYCODONE HYDROCHLORIDE 5 MG/1
5 TABLET ORAL EVERY 4 HOURS PRN
Status: DISCONTINUED | OUTPATIENT
Start: 2025-01-23 | End: 2025-01-26

## 2025-01-23 RX ORDER — ENOXAPARIN SODIUM 100 MG/ML
40 INJECTION SUBCUTANEOUS DAILY
Status: DISCONTINUED | OUTPATIENT
Start: 2025-01-23 | End: 2025-01-26

## 2025-01-23 RX ORDER — PROCHLORPERAZINE EDISYLATE 5 MG/ML
5 INJECTION INTRAMUSCULAR; INTRAVENOUS EVERY 8 HOURS PRN
Status: DISCONTINUED | OUTPATIENT
Start: 2025-01-23 | End: 2025-01-23 | Stop reason: HOSPADM

## 2025-01-23 RX ORDER — LABETALOL HYDROCHLORIDE 5 MG/ML
5 INJECTION, SOLUTION INTRAVENOUS EVERY 5 MIN PRN
Status: DISCONTINUED | OUTPATIENT
Start: 2025-01-23 | End: 2025-01-23 | Stop reason: HOSPADM

## 2025-01-23 RX ADMIN — PHENYLEPHRINE HCL 100 MCG: 10 MG/ML VIAL (ML) INJECTION at 12:54:00

## 2025-01-23 RX ADMIN — DEXAMETHASONE SODIUM PHOSPHATE 4 MG: 4 MG/ML VIAL (ML) INJECTION at 12:30:00

## 2025-01-23 RX ADMIN — LIDOCAINE HYDROCHLORIDE 40 MG: 10 INJECTION, SOLUTION EPIDURAL; INFILTRATION; INTRACAUDAL; PERINEURAL at 12:17:00

## 2025-01-23 RX ADMIN — PHENYLEPHRINE HCL 100 MCG: 10 MG/ML VIAL (ML) INJECTION at 13:06:00

## 2025-01-23 RX ADMIN — SODIUM CHLORIDE, SODIUM LACTATE, POTASSIUM CHLORIDE, CALCIUM CHLORIDE: 600; 310; 30; 20 INJECTION, SOLUTION INTRAVENOUS at 14:31:00

## 2025-01-23 RX ADMIN — ESMOLOL HYDROCHLORIDE 30 MG: 10 INJECTION INTRAVENOUS at 12:22:00

## 2025-01-23 RX ADMIN — PHENYLEPHRINE HCL 100 MCG: 10 MG/ML VIAL (ML) INJECTION at 13:17:00

## 2025-01-23 RX ADMIN — PHENYLEPHRINE HCL 100 MCG: 10 MG/ML VIAL (ML) INJECTION at 13:37:00

## 2025-01-23 RX ADMIN — ROCURONIUM BROMIDE 50 MG: 10 INJECTION, SOLUTION INTRAVENOUS at 12:18:00

## 2025-01-23 RX ADMIN — SODIUM CHLORIDE, SODIUM LACTATE, POTASSIUM CHLORIDE, CALCIUM CHLORIDE: 600; 310; 30; 20 INJECTION, SOLUTION INTRAVENOUS at 13:18:00

## 2025-01-23 RX ADMIN — ONDANSETRON 4 MG: 2 INJECTION INTRAMUSCULAR; INTRAVENOUS at 13:49:00

## 2025-01-23 RX ADMIN — PHENYLEPHRINE HCL 100 MCG: 10 MG/ML VIAL (ML) INJECTION at 12:34:00

## 2025-01-23 NOTE — BRIEF OP NOTE
Pre-Operative Diagnosis: Fibroids [D21.9]  Excessive bleeding in premenopausal period [N92.4]     Post-Operative Diagnosis: * No post-op diagnosis entered *      Procedure Performed:   Abdominal hysterectomy, bilateral salpingectomy    Surgeons and Role:     * Nataliia Sheehan MD - Primary     * Tamara Love DO - Assisting Surgeon    Assistant(s):        Surgical Findings: 26 W size fibroid uterus     Specimen: uterus, cervix, bilateral fallopian tubes to path     Estimated Blood Loss: No data recorded    Nataliia Sheehan MD  1/23/2025  6:50 AM

## 2025-01-23 NOTE — H&P
GYN H&P       CC: Patient is here for abdominal hysterectomy, bilateral salpingectomy for fibroid uterus    HPI: Patient is a 44 year old  for fibroid uterus    Has periods which are long, heavy, passing clots, anemia \"for years.\" She has been delaying getting surgery for years. Some cramping with periods.       Patient's last menstrual period was 2024.    OB History    Para Term  AB Living   1       1     SAB IAB Ectopic Multiple Live Births                  # Outcome Date GA Lbr Zeeshan/2nd Weight Sex Type Anes PTL Lv   1 AB                GYN hx:             Past Medical History:    Fibroids    Hypertension    Migraine     Past Surgical History:   Procedure Laterality Date    D & c      EAB    Insert intrauterine device      Mirena     [Allergies]    [Allergies]  No Known Allergies  Family History   Problem Relation Age of Onset    No Known Problems Mother     No Known Problems Father     No Known Problems Sister     High Blood Pressure Maternal Grandmother     Heart Disease Maternal Grandmother     Kidney Disease Maternal Grandfather         dialysis    High Blood Pressure Maternal Grandfather     Breast Cancer Neg     Ovarian Cancer Neg     Uterine Cancer Neg     Colon Cancer Neg      Social History     Socioeconomic History    Marital status: Single   Occupational History    Occupation: works from home as    Tobacco Use    Smoking status: Never    Smokeless tobacco: Never   Vaping Use    Vaping status: Never Used   Substance and Sexual Activity    Alcohol use: Yes    Drug use: Never    Sexual activity: Yes     Partners: Male     Birth control/protection: Mirena     Comment: MIrena IUD      Social History     Social History Narrative    Lives with mother and GM, feels safe    No hx of abuse       Medications reviewed. See active list.     /80   Ht 61\"   Wt 163 lb (73.9 kg)   LMP 2024   BMI 30.80 kg/m²       Exam:   GENERAL: well  developed, well nourished, in no apparent distress  SKIN: no rashes, no suspicious lesions  HEENT: normal  NECK: supple; no thyroidmegaly, no adenopathy  BREASTS: symmetrical, nontender, no palpable masses or nodes, no nipple discharge, no skin changes, no dippling, no palpable axillary adenopathy  ABDOMEN: large abdominal mass to ribs  Liver and spleen non-tender, no enlargement. No palpable hernias  GYNE/:  External Genitalia: Normal appearing, no lesions, normal hair distribution   Urethral meatus appear wnl, no abnormal discharge or lesions noted.   Bladder: well supported, urethra wnl, no palpable tenderness or masses, no discharge  Vagina: normal pink mucosa, no lesions, normal clear discharge.   Uterus: 28 W size fibroid uterus  Cervix: pink, no lesions grossly visible, no discharge  Adnexa: non tender, no palpable masses, normal size  Anus:  No lesions or visible hemorrhoids  Impression    1. Limited evaluation due to patient declining transvaginal approach, and multiple uterine fibroids obscuring view of adnexa.  2. Multiple uterine fibroids.  3. Bilateral ovaries were not assessed, due to above limitations.    ATTENDING ADDENDUM: I agree with the above report with the following additions. As above, the uterus is markedly enlarged with numerous uterine fibroids, correlating with findings on recent CT. The markedly enlarged uterus and fibroids contribute to the limited visualization given poor acoustic windows. Absence of transvaginal imaging also limits evaluation. In the region of the mid body/lower uterine segment, a linear, echogenic, shadowing structure is present, correlating with the patient's known intrauterine device. Evaluation of the endometrium and localization of the intrauterine device is somewhat limited given poor acoustic windows and distortion from the numerous fibroids. As above, the ovaries are not discretely visualized. Further evaluation based on clinical findings, which could include  an MRI of the pelvis with and without IV contrast, if further characterization is desired/warranted. Other findings as above.    I, Dr. Jesus Egan, have personally reviewed this report and concur with its findings and conclusions, as affirmed by my electronic signature.    Resident/Fellow: Efrain Doss on 01/29/2024 2:33 PM    Attending: Jesus Egan on 01/29/2024 3:04 PM  Narrative    EXAM:  US PELVIS TRANSABDOMINAL NON-OB COMPLETE; US TRANSVAGINAL; VAS DUPLEX ABDOMEN, PELVIS, SCROTUM, OR RETROPERITONEUM COMPLETE,  1/29/2024 1:40 pm; 1/29/2024 1:41 pm    HISTORY:  Pelvic pain.    COMPARISON:  Limited comparison with recent CT abdomen and pelvis    TECHNIQUE:  Multiple sonographic images were obtained of the pelvis via transabdominal approaches. A duplex Doppler study was performed consisting of integrated two-dimensional real-time imaging with color arterial and venous flow Doppler and Doppler spectral analysis utilizing high frequency linear array probe. Permanent images were obtained.    FINDINGS:  Limited evaluation as patient declined transvaginal exam, and multiple uterine fibroids obscuring view of the bilateral adnexa.    Transabdominal imaging demonstrate the anteverted uterus is heterogenous and enlarged measuring 24.0 X 9.9 x 1.7 cm. Multiple uterine fibroids, as follows:  Pedunculated fundal fibroid measuring 6.5 x 7.2 x 6.1 cm.  Intramural right lateral wall fibroid measuring 8.0 x 8.1 x 7.9 cm.  Intramural left lateral wall fibroid measuring 6.1 x 5.1 x 4.5 cm.  Intramural posterior fibroid measuring 4.5 x 4.4 x 4.2 cm    Urinary bladder is underdistended and unremarkable.    A portion of the endometrium is visualized measuring 6-7 mm in thickness. There is no evidence of free fluid within the posterior cul-de-sac.    The bilateral ovaries were not visualized, likely related to poor acoustic windows/shadowing.  Procedure Note    Jesus Egan MD - 01/29/2024  Formatting of this  note might be different from the original.  EXAM:  US PELVIS TRANSABDOMINAL NON-OB COMPLETE; US TRANSVAGINAL; VAS DUPLEX ABDOMEN, PELVIS, SCROTUM, OR RETROPERITONEUM COMPLETE,  1/29/2024 1:40 pm; 1/29/2024 1:41 pm    HISTORY:  Pelvic pain.    COMPARISON:  Limited comparison with recent CT abdomen and pelvis    TECHNIQUE:  Multiple sonographic images were obtained of the pelvis via transabdominal approaches. A duplex Doppler study was performed consisting of integrated two-dimensional real-time imaging with color arterial and venous flow Doppler and Doppler spectral analysis utilizing high frequency linear array probe. Permanent images were obtained.    FINDINGS:  Limited evaluation as patient declined transvaginal exam, and multiple uterine fibroids obscuring view of the bilateral adnexa.    Transabdominal imaging demonstrate the anteverted uterus is heterogenous and enlarged measuring 24.0 X 9.9 x 1.7 cm. Multiple uterine fibroids, as follows:  Pedunculated fundal fibroid measuring 6.5 x 7.2 x 6.1 cm.  Intramural right lateral wall fibroid measuring 8.0 x 8.1 x 7.9 cm.  Intramural left lateral wall fibroid measuring 6.1 x 5.1 x 4.5 cm.  Intramural posterior fibroid measuring 4.5 x 4.4 x 4.2 cm    Urinary bladder is underdistended and unremarkable.    A portion of the endometrium is visualized measuring 6-7 mm in thickness. There is no evidence of free fluid within the posterior cul-de-sac.    The bilateral ovaries were not visualized, likely related to poor acoustic windows/shadowing.    IMPRESSION:  1. Limited evaluation due to patient declining transvaginal approach, and multiple uterine fibroids obscuring view of adnexa.  2. Multiple uterine fibroids.  3. Bilateral ovaries were not assessed, due to above limitations.    ATTENDING ADDENDUM: I agree with the above report with the following additions. As above, the uterus is markedly enlarged with numerous uterine fibroids, correlating with findings on recent CT.  The markedly enlarged uterus and fibroids contribute to the limited visualization given poor acoustic windows. Absence of transvaginal imaging also limits evaluation. In the region of the mid body/lower uterine segment, a linear, echogenic, shadowing structure is present, correlating with the patient's known intrauterine device. Evaluation of the endometrium and localization of the intrauterine device is somewhat limited given poor acoustic windows and distortion from the numerous fibroids. As above, the ovaries are not discretely visualized. Further evaluation based on clinical findings, which could include an MRI of the pelvis with and without IV contrast, if further characterization is desired/warranted. Other findings as above.    I, Dr. Jesus Egan, have personally reviewed this report and concur with its findings and conclusions, as affirmed by my electronic signature.    Resident/Fellow: Efrain Doss on 01/29/2024 2:33 PM    Attending: Jesus Egan on 01/29/2024 3:04 PM  Exam End: -- Last Resulted: 01/29/24  3:05 PM   Received From: [facility]  Result Received: 12/05/24 11:06 AM    View Encounter    US Pelvis Complete    Anatomical Region Laterality Modality   Pelvis -- Ultrasound     Impression    1. Limited evaluation due to patient declining transvaginal approach, and multiple uterine fibroids obscuring view of adnexa.  2. Multiple uterine fibroids.  3. Bilateral ovaries were not assessed, due to above limitations.    ATTENDING ADDENDUM: I agree with the above report with the following additions. As above, the uterus is markedly enlarged with numerous uterine fibroids, correlating with findings on recent CT. The markedly enlarged uterus and fibroids contribute to the limited visualization given poor acoustic windows. Absence of transvaginal imaging also limits evaluation. In the region of the mid body/lower uterine segment, a linear, echogenic, shadowing structure is present,  correlating with the patient's known intrauterine device. Evaluation of the endometrium and localization of the intrauterine device is somewhat limited given poor acoustic windows and distortion from the numerous fibroids. As above, the ovaries are not discretely visualized. Further evaluation based on clinical findings, which could include an MRI of the pelvis with and without IV contrast, if further characterization is desired/warranted. Other findings as above.    I, Dr. Jesus Egan, have personally reviewed this report and concur with its findings and conclusions, as affirmed by my electronic signature.    Resident/Fellow: Efrain Doss on 01/29/2024 2:33 PM    Attending: Jesus Egan on 01/29/2024 3:04 PM  Narrative    EXAM:  US PELVIS TRANSABDOMINAL NON-OB COMPLETE; US TRANSVAGINAL; VAS DUPLEX ABDOMEN, PELVIS, SCROTUM, OR RETROPERITONEUM COMPLETE,  1/29/2024 1:40 pm; 1/29/2024 1:41 pm    HISTORY:  Pelvic pain.    COMPARISON:  Limited comparison with recent CT abdomen and pelvis    TECHNIQUE:  Multiple sonographic images were obtained of the pelvis via transabdominal approaches. A duplex Doppler study was performed consisting of integrated two-dimensional real-time imaging with color arterial and venous flow Doppler and Doppler spectral analysis utilizing high frequency linear array probe. Permanent images were obtained.    FINDINGS:  Limited evaluation as patient declined transvaginal exam, and multiple uterine fibroids obscuring view of the bilateral adnexa.    Transabdominal imaging demonstrate the anteverted uterus is heterogenous and enlarged measuring 24.0 X 9.9 x 1.7 cm. Multiple uterine fibroids, as follows:  Pedunculated fundal fibroid measuring 6.5 x 7.2 x 6.1 cm.  Intramural right lateral wall fibroid measuring 8.0 x 8.1 x 7.9 cm.  Intramural left lateral wall fibroid measuring 6.1 x 5.1 x 4.5 cm.  Intramural posterior fibroid measuring 4.5 x 4.4 x 4.2 cm    Urinary bladder is underdistended  and unremarkable.    A portion of the endometrium is visualized measuring 6-7 mm in thickness. There is no evidence of free fluid within the posterior cul-de-sac.    The bilateral ovaries were not visualized, likely related to poor acoustic windows/shadowing.  Procedure Note    Jesus Egan MD - 01/29/2024  Formatting of this note might be different from the original.  EXAM:  US PELVIS TRANSABDOMINAL NON-OB COMPLETE; US TRANSVAGINAL; VAS DUPLEX ABDOMEN, PELVIS, SCROTUM, OR RETROPERITONEUM COMPLETE,  1/29/2024 1:40 pm; 1/29/2024 1:41 pm    HISTORY:  Pelvic pain.    COMPARISON:  Limited comparison with recent CT abdomen and pelvis    TECHNIQUE:  Multiple sonographic images were obtained of the pelvis via transabdominal approaches. A duplex Doppler study was performed consisting of integrated two-dimensional real-time imaging with color arterial and venous flow Doppler and Doppler spectral analysis utilizing high frequency linear array probe. Permanent images were obtained.    FINDINGS:  Limited evaluation as patient declined transvaginal exam, and multiple uterine fibroids obscuring view of the bilateral adnexa.    Transabdominal imaging demonstrate the anteverted uterus is heterogenous and enlarged measuring 24.0 X 9.9 x 1.7 cm. Multiple uterine fibroids, as follows:  Pedunculated fundal fibroid measuring 6.5 x 7.2 x 6.1 cm.  Intramural right lateral wall fibroid measuring 8.0 x 8.1 x 7.9 cm.  Intramural left lateral wall fibroid measuring 6.1 x 5.1 x 4.5 cm.  Intramural posterior fibroid measuring 4.5 x 4.4 x 4.2 cm    Urinary bladder is underdistended and unremarkable.    A portion of the endometrium is visualized measuring 6-7 mm in thickness. There is no evidence of free fluid within the posterior cul-de-sac.    The bilateral ovaries were not visualized, likely related to poor acoustic windows/shadowing.    IMPRESSION:  1. Limited evaluation due to patient declining transvaginal approach, and  multiple uterine fibroids obscuring view of adnexa.  2. Multiple uterine fibroids.  3. Bilateral ovaries were not assessed, due to above limitations.    ATTENDING ADDENDUM: I agree with the above report with the following additions. As above, the uterus is markedly enlarged with numerous uterine fibroids, correlating with findings on recent CT. The markedly enlarged uterus and fibroids contribute to the limited visualization given poor acoustic windows. Absence of transvaginal imaging also limits evaluation. In the region of the mid body/lower uterine segment, a linear, echogenic, shadowing structure is present, correlating with the patient's known intrauterine device. Evaluation of the endometrium and localization of the intrauterine device is somewhat limited given poor acoustic windows and distortion from the numerous fibroids. As above, the ovaries are not discretely visualized. Further evaluation based on clinical findings, which could include an MRI of the pelvis with and without IV contrast, if further characterization is desired/warranted. Other findings as above.    I, Dr. Jseus Egan, have personally reviewed this report and concur with its findings and conclusions, as affirmed by my electronic signature.    Resident/Fellow: Efrain Doss on 01/29/2024 2:33 PM    Attending: Jesus Egan on 01/29/2024 3:04 PM  Exam End: 01/29/24  2:22 PM Last Resulted: 01/29/24  3:05 PM   Received From: [facility]  Result Received: 02/29/24  3:30 PM          A/P: Patient is 44 year old female     1. Breast cancer screening by mammogram  - [facility] DULCE 2D+3D SCREENING BILAT (CPT=77067/44351); Future    2. Fibroids  - CBC, Platelet; No Differential; Future  - Type and screen    3. Excessive bleeding in premenopausal period    Plan 1/23/2025 TAHbilateral salpingectomy I discussed with the patient  the procedure including the preop/procedure/postop course and the risks of  bleeding, infection, damage to  internal organs.  All questions were answered, and written information was given to the pt regarding the procedure.    Nataliia Sheehan MD

## 2025-01-23 NOTE — ANESTHESIA PREPROCEDURE EVALUATION
Anesthesia PreOp Note    HPI:     Teagan Javier is a 44 year old female who presents for preoperative consultation requested by: Nataliia Sheehan MD    Date of Surgery: 1/23/2025    Procedure(s):  Abdominal hysterectomy, bilateral salpingectomy  Indication: Fibroids [D21.9]  Excessive bleeding in premenopausal period [N92.4]    Relevant Problems   No relevant active problems       NPO:                         History Review:  Patient Active Problem List    Diagnosis Date Noted    Encounter for postoperative care 01/23/2025    Intramural and subserous leiomyoma of uterus 01/23/2025    Fibroids 02/29/2024    Excessive bleeding in premenopausal period 02/29/2024    History of use of contraceptive intrauterine device (IUD) 2014       Past Medical History:    Esophageal reflux    Fibroids    High blood pressure    Hypertension    Migraine    Migraines    Visual impairment       Past Surgical History:   Procedure Laterality Date    D & c  2014    EAB    Insert intrauterine device  2014    Mirena       Prescriptions Prior to Admission[1]  Current Medications and Prescriptions Ordered in Epic[2]    Allergies[3]    Family History   Problem Relation Age of Onset    No Known Problems Mother     No Known Problems Father     No Known Problems Sister     High Blood Pressure Maternal Grandmother     Heart Disease Maternal Grandmother     Kidney Disease Maternal Grandfather         dialysis    High Blood Pressure Maternal Grandfather     Breast Cancer Neg     Ovarian Cancer Neg     Uterine Cancer Neg     Colon Cancer Neg      Social History     Socioeconomic History    Marital status: Single   Occupational History    Occupation: works from home as    Tobacco Use    Smoking status: Never    Smokeless tobacco: Never   Vaping Use    Vaping status: Never Used   Substance and Sexual Activity    Alcohol use: Not Currently     Comment: VERY OCCASIONALLY    Drug use: Not Currently     Comment: CBD  GUMMIES RARELY    Sexual activity: Yes     Partners: Male     Birth control/protection: Mirena     Comment: MIrena IUD 2014/2015       Available pre-op labs reviewed.  Lab Results   Component Value Date    WBC 7.6 01/17/2025    RBC 3.74 (L) 01/17/2025    HGB 9.2 (L) 01/17/2025    HCT 29.4 (L) 01/17/2025    MCV 78.6 (L) 01/17/2025    MCH 24.6 (L) 01/17/2025    MCHC 31.3 01/17/2025    RDW 16.8 (H) 01/17/2025    .0 (H) 01/17/2025             Vital Signs:  Body mass index is 30.23 kg/m².   height is 1.549 m (5' 1\") and weight is 72.6 kg (160 lb).   Vitals:    01/21/25 0916   Weight: 72.6 kg (160 lb)   Height: 1.549 m (5' 1\")        Anesthesia Evaluation     Patient summary reviewed and Nursing notes reviewed    Airway   Mallampati: II  TM distance: >3 FB  Neck ROM: full  Dental - Dentition appears grossly intact     Pulmonary     breath sounds clear to auscultation  Cardiovascular   Exercise tolerance: good  (+) hypertension    Rhythm: regular  Rate: normal    Neuro/Psych    (+)  headaches,        GI/Hepatic/Renal    (+) GERD    Endo/Other    Abdominal                  Anesthesia Plan:   ASA:  2  Plan:   General  Airway:  ETT and Video laryngoscope  Informed Consent Plan and Risks Discussed With:  Patient and mother  Discussed plan with:  Attending      I have informed Teagan BRITT DayYaya and/or legal guardian or family member of the nature of the anesthetic plan, benefits, risks including possible dental damage if relevant, major complications, and any alternative forms of anesthetic management.   All of the patient's questions were answered to the best of my ability. The patient desires the anesthetic management as planned.  Rell Rolon MD  1/23/2025 10:17 AM  Present on Admission:  **None**           [1]   No medications prior to admission.   [2]   No current Epic-ordered facility-administered medications on file.     Current Outpatient Medications Ordered in Epic   Medication Sig Dispense Refill     losartan-hydroCHLOROthiazide 100-25 MG Oral Tab daily.      amLODIPine 10 MG Oral Tab daily.      Rimegepant Sulfate 75 MG Oral Tablet Dispersible Take 75 mg by mouth every other day. FOR MIGRAINE HEADACHES      famotidine 40 MG Oral Tab Take 1 tablet (40 mg total) by mouth daily.      albuterol 108 (90 Base) MCG/ACT Inhalation Aero Soln Inhale 1 puff into the lungs every 6 (six) hours as needed.     [3] No Known Allergies

## 2025-01-23 NOTE — ANESTHESIA PROCEDURE NOTES
Airway  Date/Time: 1/23/2025 12:20 PM  Urgency: elective    Airway not difficult    General Information and Staff    Patient location during procedure: OR  Anesthesiologist: Rell Rolon MD  Performed: anesthesiologist   Performed by: Rell Rolon MD  Authorized by: Rell Rolon MD      Indications and Patient Condition  Indications for airway management: anesthesia  Sedation level: deep  Preoxygenated: yes  Patient position: sniffing  MILS maintained throughout  Mask difficulty assessment: 1 - vent by mask    Final Airway Details  Final airway type: endotracheal airway      Successful airway: ETT  Cuffed: yes   Successful intubation technique: Video laryngoscopy  Facilitating devices/methods: intubating stylet  Endotracheal tube insertion site: oral  Blade type: Nimbula video laryngoscope.  Blade size: #3  ETT size (mm): 7.5    Cormack-Lehane Classification: grade I - full view of glottis  Placement verified by: capnometry   Measured from: lips  ETT to lips (cm): 21  Number of attempts at approach: 1

## 2025-01-23 NOTE — CONSULTS
[facility]    PATIENT'S NAME: DANY JOYA   ATTENDING PHYSICIAN: Nataliia Sheehan MD   CONSULTING PHYSICIAN: Soco Heath MD   PATIENT ACCOUNT#:   [id number]    LOCATION:  [facility] PACU 1 [facility] 10  MEDICAL RECORD #:   [id number]       YOB: 1980  ADMISSION DATE:       01/23/2025      CONSULT DATE:  01/23/2025    REPORT OF CONSULTATION      REASON FOR ADMISSION:  Post total abdominal hysterectomy.    HISTORY OF PRESENT ILLNESS:  Patient is a 44-year-old  female with fibroid uterus, chronic heavy menstrual bleed despite conservative medical management options, complicated by anemia.  Scheduled today for above-mentioned procedure by her gynecologist, Dr. Gaines.  Postoperatively, transferred to PACU for further monitoring.    PAST MEDICAL HISTORY:  Hypertension, migraines, gastroesophageal reflux disease, fibroid uterus.     PAST SURGICAL HISTORY:  D and C procedure.    MEDICATIONS:  Please see medication reconciliation list.      ALLERGIES:  No known drug allergies.    SOCIAL HISTORY:  No tobacco or drug use.  Social alcohol.      FAMILY HISTORY:  Positive for hypertension.    REVIEW OF SYSTEMS:  Currently resting in bed, complaining of abdominal cramps.  No chest pain.  No shortness of breath.  Other 12-point review of systems is negative.        PHYSICAL EXAMINATION:    GENERAL:  Alert, oriented to time, place, and person.  Mild to moderate distress.   VITAL SIGNS:  Temperature 97.7, pulse 99, respiratory rate 18, blood pressure 132/86, pulse ox 100% on room air.    HEENT:  Atraumatic.  Oropharynx clear.  Dry mucous membranes.  Ears, nose normal.  Eyes:  Anicteric sclerae.    NECK:  Supple.  No lymphadenopathy.  Trachea midline.  Full range of motion.    LUNGS:  Clear to auscultation bilaterally.  Normal respiratory effort.   HEART:  Regular rate and rhythm.  S1, S2 auscultated.  No murmur.    ABDOMEN:  Soft.  Hypoactive bowel sounds.  Lower abdominal  dressing.   EXTREMITIES:  No peripheral edema, clubbing, or cyanosis.    NEUROLOGIC:  Motor and sensory intact.    ASSESSMENT AND PLAN:    1.   Fibroid uterus with chronic heavy menstrual bleed, status post abdominal hysterectomy and bilateral salpingectomy.  Pain control.  Monitor hemodynamic status.  DVT prophylaxis.  Full pathology report is still pending.  2.   Essential hypertension.  Continue home medications and monitor.    Dictated By Soco Heath MD  d: 01/23/2025 15:00:39  t: 01/23/2025 15:17:39  [facility] [id number]  FB/

## 2025-01-24 LAB
ALBUMIN SERPL-MCNC: 3.3 G/DL (ref 3.2–4.8)
ALBUMIN/GLOB SERPL: 1.5 {RATIO} (ref 1–2)
ALP LIVER SERPL-CCNC: 52 U/L
ALT SERPL-CCNC: 11 U/L
ANION GAP SERPL CALC-SCNC: 6 MMOL/L (ref 0–18)
ANTIBODY SCREEN: NEGATIVE
AST SERPL-CCNC: 15 U/L (ref ?–34)
BASOPHILS # BLD AUTO: 0.02 X10(3) UL (ref 0–0.2)
BASOPHILS NFR BLD AUTO: 0.2 %
BILIRUB SERPL-MCNC: 0.3 MG/DL (ref 0.3–1.2)
BUN BLD-MCNC: 8 MG/DL (ref 9–23)
BUN/CREAT SERPL: 9.2 (ref 10–20)
CALCIUM BLD-MCNC: 8.3 MG/DL (ref 8.7–10.4)
CHLORIDE SERPL-SCNC: 107 MMOL/L (ref 98–112)
CO2 SERPL-SCNC: 28 MMOL/L (ref 21–32)
CREAT BLD-MCNC: 0.87 MG/DL
DEPRECATED RDW RBC AUTO: 48.3 FL (ref 35.1–46.3)
EGFRCR SERPLBLD CKD-EPI 2021: 85 ML/MIN/1.73M2 (ref 60–?)
EOSINOPHIL # BLD AUTO: 0.02 X10(3) UL (ref 0–0.7)
EOSINOPHIL NFR BLD AUTO: 0.2 %
ERYTHROCYTE [DISTWIDTH] IN BLOOD BY AUTOMATED COUNT: 16.9 % (ref 11–15)
GLOBULIN PLAS-MCNC: 2.2 G/DL (ref 2–3.5)
GLUCOSE BLD-MCNC: 126 MG/DL (ref 70–99)
HCT VFR BLD AUTO: 19.8 %
HGB BLD-MCNC: 6 G/DL
HGB BLD-MCNC: 7.8 G/DL
IMM GRANULOCYTES # BLD AUTO: 0.05 X10(3) UL (ref 0–1)
IMM GRANULOCYTES NFR BLD: 0.4 %
LYMPHOCYTES # BLD AUTO: 1.8 X10(3) UL (ref 1–4)
LYMPHOCYTES NFR BLD AUTO: 16 %
MCH RBC QN AUTO: 23.5 PG (ref 26–34)
MCHC RBC AUTO-ENTMCNC: 30.3 G/DL (ref 31–37)
MCV RBC AUTO: 77.6 FL
MONOCYTES # BLD AUTO: 0.7 X10(3) UL (ref 0.1–1)
MONOCYTES NFR BLD AUTO: 6.2 %
NEUTROPHILS # BLD AUTO: 8.64 X10 (3) UL (ref 1.5–7.7)
NEUTROPHILS # BLD AUTO: 8.64 X10(3) UL (ref 1.5–7.7)
NEUTROPHILS NFR BLD AUTO: 77 %
OSMOLALITY SERPL CALC.SUM OF ELEC: 292 MOSM/KG (ref 275–295)
PLATELET # BLD AUTO: 399 10(3)UL (ref 150–450)
POTASSIUM SERPL-SCNC: 3.7 MMOL/L (ref 3.5–5.1)
PROT SERPL-MCNC: 5.5 G/DL (ref 5.7–8.2)
RBC # BLD AUTO: 2.55 X10(6)UL
RH BLOOD TYPE: POSITIVE
SODIUM SERPL-SCNC: 141 MMOL/L (ref 136–145)
WBC # BLD AUTO: 11.2 X10(3) UL (ref 4–11)

## 2025-01-24 PROCEDURE — 30233N1 TRANSFUSION OF NONAUTOLOGOUS RED BLOOD CELLS INTO PERIPHERAL VEIN, PERCUTANEOUS APPROACH: ICD-10-PCS | Performed by: HOSPITALIST

## 2025-01-24 PROCEDURE — 99233 SBSQ HOSP IP/OBS HIGH 50: CPT | Performed by: HOSPITALIST

## 2025-01-24 RX ORDER — SODIUM CHLORIDE 9 MG/ML
INJECTION, SOLUTION INTRAVENOUS ONCE
Status: COMPLETED | OUTPATIENT
Start: 2025-01-24 | End: 2025-01-24

## 2025-01-24 NOTE — PLAN OF CARE
A&ox4. RA. Lovenox for dvt prophylaxis. Burping but not passing gas yet. Hammond removed. Voiding freely. Hgb low this am, 1 unit prbcs given. Repeat hgb 7.8. denies n/v. Pain managed with prn oxy and toradol. Transverse incision covered in tegaderm. Abdominal binder in place. Scant drainage on peripad. Up stby assist, ambulated in [place]ana today. IVF to L hand PIV. Call light in reach and using appropriately.   Problem: Patient Centered Care  Goal: Patient preferences are identified and integrated in the patient's plan of care  Description: Interventions:  - What would you like us to know as we care for you? My fiancee will be staying with me tonight  - Provide timely, complete, and accurate information to patient/family  - Incorporate patient and family knowledge, values, beliefs, and cultural backgrounds into the planning and delivery of care  - Encourage patient/family to participate in care and decision-making at the level they choose  - Honor patient and family perspectives and choices  Outcome: Progressing

## 2025-01-24 NOTE — OPERATIVE REPORT
[facility]    PATIENT'S NAME: DANY JOYA   ATTENDING PHYSICIAN: Nataliia Sheehan MD   OPERATING PHYSICIAN: Nataliia Sheehan MD   PATIENT ACCOUNT#:   [id number]    LOCATION:  [address] [place] [address]  MEDICAL RECORD #:   [id number]       YOB: 1980  ADMISSION DATE:       01/23/2025      OPERATION DATE:  01/23/2025    OPERATIVE REPORT      PREOPERATIVE DIAGNOSIS:  Fibroid uterus, menorrhagia.  POSTOPERATIVE DIAGNOSIS:  Fibroid uterus, menorrhagia.  PROCEDURE:  Abdominal hysterectomy, bilateral salpingectomy.     ASSISTANT SURGEON:  Tamara Love DO.    COMPLICATIONS:  None.    SPECIMEN:  Uterus, cervix, bilateral fallopian tubes to Pathology.    ESTIMATED BLOOD LOSS:  500 mL.    FINDINGS:  A 26-week size fibroid uterus.  Normal tubes and ovaries bilaterally.    OPERATIVE TECHNIQUE:  Patient was taken to the operating room, placed supine on the table.  General anesthesia was induced.  A Hammond catheter was placed sterilely.  The abdomen, perineum, and vagina were prepped and draped in the usual sterile fashion.  A time-out was performed.  The patient was examined under anesthesia.  It was felt that a transverse incision would be applicable.  A Pfannenstiel incision was made via the use of a knife.  That incision was carried down to the level of the fascia which was entered via the use of a knife.  That incision was extended laterally, superiorly, inferiorly with the use of sharp and blunt dissection.  The rectus muscles were partially incised.  The uterus was grasped with a double-tooth tenaculum and brought out through the abdominal incision.  There was noted to be multiple fibroids as well as a large right cornual fibroid near the infundibulopelvic ligament and tubo-ovarian ligament.  The round ligaments were bilaterally Kocher clamped, cut with the use of Hendrix scissors, and suture ligatured with 0 Vicryl.  The anterior and posterior leaf of the broad ligament was entered.   The bladder was dissected off the uterus.  The large fibroid on the right side was close to the infundibulopelvic ligament.  This was dissected off the fibroid.  It was clamped at the level of the tubo-ovarian ligament, suture ligatured with 0 Vicryl.  On the left side after clamping and cutting the round ligament, a window was made through the broad ligament, and this was double-curved Lu clamped, cut with the use of Hendrix scissors and free tied, and suture ligatured with 0 Vicryl.  The uterine vessels were bilaterally skeletonized.  The bladder was further dissected off the anterior surface of the uterus.  Adhesions in the posterior uterus were incised.  The uterine vessels were double-curved Lu clamped, cut with the use of Hendrix scissors and suture ligatured with 0 Vicryl.  All remaining clamps were placed medial to this using a straight Lu as well as a knife and suture ligatured using this on both sides.  By palpation, we could note that we were at the inferior-most aspect of the cervix.  Two curved Lu clamps were placed on the right and left side.  The specimen was removed using Jesse scissors.  The remainder of the vaginal cuff was elevated using long Kochers.  The vaginal corners were closed with figure-of-eight stitches of 0 Vicryl bilaterally.  The remainder of the vaginal cuff was closed with a figure-of-eight stitch of 0 Vicryl.  Hemostasis was adequate.  At this point, the right and left fallopian tubes were elevated, clamped with the use of a curved 6 and Metzenbaum scissors, and suture ligatured with 0 Vicryl.  Hemostasis was adequate.  The patient was then placed in Trendelenburg.  The Bryan retractor was placed, and all the pedicles were checked for hemostasis which was adequate.  Surgicel powder was placed in the lower pelvis as well in side walls.  All instruments and retractors removed from the abdomen.  The rectus muscles were checked for hemostasis which was adequate.  The  fascia was closed with 1 running stitch of 0 Vicryl.  The subcutaneous tissue was irrigated and closed with a running stitch of 2-0 plain gut.  The skin was closed with a running subcuticular stitch of 4-0 Vicryl.  All sponge, needle, and instrument counts were correct.  The patient was taken to the recovery room in stable condition.    Dictated By Nataliia Sheehan MD  d: 01/23/2025 14:16:01  t: 01/23/2025 19:02:39  [facility] [id number]  /

## 2025-01-24 NOTE — PROGRESS NOTES
Pain well controlled. No further nausea/vomiting. Ambulating well. Voiding freely.     Patient Vitals for the past 24 hrs:   BP Temp Temp src Pulse Resp SpO2 Weight   01/24/25 1134 125/85 99 °F (37.2 °C) Oral 99 18 99 % --   01/24/25 1049 111/63 98.1 °F (36.7 °C) Oral 108 18 95 % --   01/24/25 1032 114/57 98.3 °F (36.8 °C) Oral 113 18 100 % --   01/24/25 0900 133/77 -- -- 106 -- -- --   01/24/25 0806 118/63 98.4 °F (36.9 °C) Oral 95 16 96 % --   01/24/25 0345 119/68 99 °F (37.2 °C) Oral 97 16 98 % --   01/23/25 2358 123/70 98.3 °F (36.8 °C) Oral 94 16 99 % --   01/23/25 1928 127/83 -- -- 83 18 100 % --   01/23/25 1622 -- -- -- -- -- -- 161 lb (73 kg)   01/23/25 1619 156/74 98.5 °F (36.9 °C) Oral 103 20 97 % --   01/23/25 1610 (!) 155/91 98.7 °F (37.1 °C) Oral 107 26 100 % --   01/23/25 1600 149/84 -- -- 99 14 100 % --   01/23/25 1550 149/89 -- -- 106 14 100 % --   01/23/25 1540 141/85 -- -- 95 15 100 % --   01/23/25 1530 (!) 156/91 -- -- 97 16 100 % --   01/23/25 1520 148/87 -- -- 101 18 100 % --   01/23/25 1510 (!) 132/92 -- -- 100 15 96 % --   01/23/25 1500 117/80 -- -- 101 14 100 % --   01/23/25 1450 120/88 -- -- 100 10 100 % --   01/23/25 1440 132/86 -- -- 99 18 100 % --   01/23/25 1430 145/82 97.7 °F (36.5 °C) Temporal 100 17 100 % --     ABD: wound cdi  EXT: no calf tenderness  Component      Latest Ref Rng 1/24/2025   WBC      4.0 - 11.0 x10(3) uL 11.2 (H)    RBC      3.80 - 5.30 x10(6)uL 2.55 (L)    Hemoglobin      12.0 - 16.0 g/dL 6.0 (LL)    Hematocrit      35.0 - 48.0 % 19.8 (L)    MCV      80.0 - 100.0 fL 77.6 (L)    MCH      26.0 - 34.0 pg 23.5 (L)    MCHC      31.0 - 37.0 g/dL 30.3 (L)    RDW-SD      35.1 - 46.3 fL 48.3 (H)    RDW      11.0 - 15.0 % 16.9 (H)    Platelet Count      150.0 - 450.0 10(3)uL 399.0    Prelim Neutrophil Abs      1.50 - 7.70 x10 (3) uL 8.64 (H)    Neutrophils Absolute      1.50 - 7.70 x10(3) uL 8.64 (H)    Lymphocytes Absolute      1.00 - 4.00 x10(3) uL 1.80    Monocytes  Absolute      0.10 - 1.00 x10(3) uL 0.70    Eosinophils Absolute      0.00 - 0.70 x10(3) uL 0.02    Basophils Absolute      0.00 - 0.20 x10(3) uL 0.02    Immature Granulocyte Absolute      0.00 - 1.00 x10(3) uL 0.05    Neutrophils %      % 77.0    Lymphocytes %      % 16.0    Monocytes %      % 6.2    Eosinophils %      % 0.2    Basophils %      % 0.2    Immature Granulocyte %      % 0.4    Glucose      70 - 99 mg/dL 126 (H)    Sodium      136 - 145 mmol/L 141    Potassium      3.5 - 5.1 mmol/L 3.7    Chloride      98 - 112 mmol/L 107    Carbon Dioxide, Total      21.0 - 32.0 mmol/L 28.0    ANION GAP      0 - 18 mmol/L 6    BUN      9 - 23 mg/dL 8 (L)    CREATININE      0.55 - 1.02 mg/dL 0.87    BUN/CREATININE RATIO      10.0 - 20.0  9.2 (L)    CALCIUM      8.7 - 10.4 mg/dL 8.3 (L)    CALCULATED OSMOLALITY      275 - 295 mOsm/kg 292    EGFR      >=60 mL/min/1.73m2 85    ALT (SGPT)      10 - 49 U/L 11    AST (SGOT)      <34 U/L 15    ALKALINE PHOSPHATASE      37 - 98 U/L 52    Total Bilirubin      0.3 - 1.2 mg/dL 0.3    PROTEIN, TOTAL      5.7 - 8.2 g/dL 5.5 (L)    Albumin      3.2 - 4.8 g/dL 3.3    Globulin      2.0 - 3.5 g/dL 2.2    A/G Ratio      1.0 - 2.0  1.5    ABO BLOOD TYPE O    RH Factor Positive    MD Blood Smear Consult Evaluation of CBC with differential data and the peripheral blood smear demonstrates severe/critical hypochromic microcytic anemia with decreased absolute RBC count and minimal leukocytosis with mild neutrophilia. …    ANTIBODY SCREEN Negative       Legend:  (H) High  (L) Low  (LL) Low Panic    POD#1 CEM. DW her surgical finding    Postop anemia. Asymptomatic but will give 1 unit prbcs and recheck.

## 2025-01-24 NOTE — PROGRESS NOTES
[facility]  part of [facility]    Progress Note    Teagan Javier Patient Status:  Inpatient    1980 MRN [id number]   Location [facility] 4W/SW/SE Attending Lucas Berger MD   Hosp Day # 1 PCP Laya Chiu MD     Chief Complaint: post CEM    Subjective:   Teagan Javier is doing well. Pain medicine helping pain is 9/10 before meds 8/10 after. No dizziness no N/V at this time nauseated earlier with dilaudid IV. Family at bedside. No flatus       Objective:   Objective:    Blood pressure 121/58, pulse 103, temperature 98.8 °F (37.1 °C), temperature source Oral, resp. rate 18, height 5' 1\" (1.549 m), weight 161 lb (73 kg), last menstrual period 2024, SpO2 97%.    Physical Exam:    General: No acute distress.   Respiratory: Clear to auscultation bilaterally. No wheezes. No rhonchi.  Cardiovascular: S1, S2. Regular rate and rhythm. No murmurs, rubs or gallops.   Abdomen: Soft, nontender, nondistended.  Positive bowel sounds. No rebound or guarding.  Neurologic: No focal neurological deficits.   Musculoskeletal: Moves all extremities.  Extremities: No edema.      Results:   Results:    Labs:  Recent Labs   Lab 25  1443 25  0621   WBC 7.6 11.2*   HGB 9.2* 6.0*   MCV 78.6* 77.6*   .0* 399.0       Recent Labs   Lab 25  1131 25  0621   GLU 97 126*   BUN 11 8*   CREATSERUM 0.95 0.87   CA 9.7 8.3*   ALB  --  3.3    141   K 3.6 3.7    107   CO2 26.0 28.0   ALKPHO  --  52   AST  --  15   ALT  --  11   BILT  --  0.3   TP  --  5.5*       Estimated Creatinine Clearance: 63.6 mL/min (based on SCr of 0.87 mg/dL).    No results for input(s): \"PTP\", \"INR\" in the last 168 hours.         Culture:  No results found for this visit on 25.    Cardiac  No results for input(s): \"TROP\", \"PBNP\" in the last 168 hours.      Imaging: Imaging data reviewed in Epic.  No results found.    Medications:    amLODIPine  10 mg Oral Daily    famotidine   40 mg Oral Daily    losartan (Cozaar) 100 mg, hydroCHLOROthiazide 12.5 mg for Hyzaar 100/12.5 ([facility] only)   Oral Daily    [Held by provider] Rimegepant Sulfate  75 mg Oral QOD    enoxaparin  40 mg Subcutaneous Daily         Assessment and Plan:   Assessment & Plan:        Fibroid Uterus   Menorrhagia   S/p total abdominal hysterectomy with B/L Salpingectomy   Path reviewed.   GYNE on consult.   Pain control.   DVT prevention with lovenox   DC quiros   Increase ambulation.   Essential HTN   Norvasc, losartan, hydrochlorothiazide  Acute blood loss anemia   EBL 500mL  Transfuse 1unit PRBC today   Monitor H/H   Baseline Hb per EMR 9-10        >55min spent, >50% spent counseling and coordinating care in the form of educating pt/family and d/w consultants and staff. Most of the time spent discussing the above plan.        Plan of care discussed with patient or family at bedside.    Lucas Berger MD  Hospitalist          Supplementary Documentation:     Quality:  DVT Prophylaxis: Lovenox   CODE status: Full  Dispo: per clinical course            Estimated date of discharge: TBD  Discharge is dependent on: clinical stability  At this point Ms. Javier is expected to be discharge to: home

## 2025-01-24 NOTE — PAYOR COMM NOTE
--------------  CONTINUED STAY REVIEW    Payor: [employer]/HMO/POS/EPO  Subscriber #:  [id number]  Authorization Number: [id number]    Admit date: 1/23/25  Admit time: 11:29 AM    REVIEW DOCUMENTATION:    PREOPERATIVE DIAGNOSIS:  Fibroid uterus, menorrhagia.  POSTOPERATIVE DIAGNOSIS:  Fibroid uterus, menorrhagia.  PROCEDURE:  Abdominal hysterectomy, bilateral salpingectomy.      FINDINGS: A 26-week size fibroid uterus. Normal tubes and ovaries bilaterally.     Postoperatively, transferred to PACU for further monitoring.     PAST MEDICAL HISTORY:  Hypertension, migraines, gastroesophageal reflux disease, fibroid uterus.      PAST SURGICAL HISTORY:  D and C procedure.       PHYSICAL EXAMINATION:    GENERAL:  Alert, oriented to time, place, and person.  Mild to moderate distress.   VITAL SIGNS:  Temperature 97.7, pulse 99, respiratory rate 18, blood pressure 132/86, pulse ox 100% on room air.    HEENT:  Atraumatic.  Oropharynx clear.  Dry mucous membranes.  Ears, nose normal.  Eyes:  Anicteric sclerae.    NECK:  Supple.  No lymphadenopathy.  Trachea midline.  Full range of motion.    LUNGS:  Clear to auscultation bilaterally.  Normal respiratory effort.   HEART:  Regular rate and rhythm.  S1, S2 auscultated.  No murmur.    ABDOMEN:  Soft.  Hypoactive bowel sounds.  Lower abdominal dressing.   EXTREMITIES:  No peripheral edema, clubbing, or cyanosis.    NEUROLOGIC:  Motor and sensory intact.     ASSESSMENT AND PLAN:    1.       Fibroid uterus with chronic heavy menstrual bleed, status post abdominal hysterectomy and bilateral salpingectomy.  Pain control.  Monitor hemodynamic status.  DVT prophylaxis.  Full pathology report is still pending.  2.       Essential hypertension.  Continue home medications and monitor.             MEDICATIONS ADMINISTERED IN LAST 1 DAY:  acetaminophen (Tylenol Extra Strength) tab 1,000 mg       Date Action Dose Route User    1/23/2025 1154 Given 1,000 mg Oral Nicki Gallegos RN           amLODIPine (Norvasc) tab 10 mg       Date Action Dose Route User    1/24/2025 0911 Given 10 mg Oral Kimberly Patiño RN          ceFAZolin (Ancef) 2g in 10mL IV syringe premix       Date Action Dose Route User    1/23/2025 1225 Given 2 g Intravenous Rell Rolon MD          dexamethasone (Decadron) 4 MG/ML injection       Date Action Dose Route User    1/23/2025 1230 Given 4 mg Intravenous Rell Rolon MD          dextrose in lactated ringers 5% infusion       Date Action Dose Route User    1/24/2025 0336 New Bag (none) Intravenous Roberta Kumar RN    1/23/2025 1703 New Bag (none) Intravenous Alena Barone RN          losartan (Cozaar) 100 mg, hydroCHLOROthiazide 12.5 mg for Hyzaar 100/12.5 ([facility] only)       Date Action Dose Route User    1/24/2025 0911 Given (none) Oral Kimberly Patiño RN    1/23/2025 1700 Given (none) Oral Alena Barone RN          enoxaparin (Lovenox) 40 MG/0.4ML SUBQ injection 40 mg       Date Action Dose Route User    1/24/2025 0911 Given 40 mg Subcutaneous (Right Lower Abdomen) Kimberly Patiño RN    1/23/2025 1700 Given 40 mg Subcutaneous (Right Lower Abdomen) Alena Barone RN          esmolol (Brevibloc) 100 mg/10mL injection       Date Action Dose Route User    1/23/2025 1222 Given 30 mg Intravenous Rell Rolon MD          famotidine (Pepcid) tab 20 mg       Date Action Dose Route User    1/23/2025 1154 Given 20 mg Oral Nicki Gallegos RN          famotidine (Pepcid) tab 40 mg       Date Action Dose Route User    1/24/2025 0910 Given 40 mg Oral Kimberly Patiño RN          fentaNYL (Sublimaze) 50 mcg/mL injection       Date Action Dose Route User    1/23/2025 1400 Given 25 mcg Intravenous Rell Rolon MD    1/23/2025 1246 Given 50 mcg Intravenous Rell Rolon MD    1/23/2025 1228 Given 50 mcg Intravenous Rell Rolon MD    1/23/2025 1219 Given 50 mcg Intravenous Rell Rolon MD          fentaNYL (Sublimaze) 50 mcg/mL injection 50 mcg       Date  Action Dose Route User    1/23/2025 1440 Given 50 mcg Intravenous Ashley Craig RN    1/23/2025 1435 Given 50 mcg Intravenous Ashley Craig RN          HYDROmorphone (Dilaudid) 1 MG/ML injection 0.4 mg       Date Action Dose Route User    1/23/2025 1933 Given 0.4 mg Intravenous Alena Barone RN          HYDROmorphone (Dilaudid) 1 MG/ML injection 0.4 mg       Date Action Dose Route User    1/23/2025 1510 Given 0.4 mg Intravenous Ashley Craig RN    1/23/2025 1455 Given 0.4 mg Intravenous Ashley Criag RN          HYDROmorphone (Dilaudid) 1 MG/ML injection 0.6 mg       Date Action Dose Route User    1/23/2025 1529 Given 0.5 mg Intravenous Ashley Craig RN    1/23/2025 1505 Given 0.6 mg Intravenous Ashley Craig RN    1/23/2025 1450 Given 0.6 mg Intravenous Ashley Craig RN          ketorolac (Toradol) 15 MG/ML injection 15 mg       Date Action Dose Route User    1/23/2025 1457 Given 15 mg Intravenous Ashley Craig RN          lactated ringers infusion 125 ML HR       Date Action Dose Route User    1/23/2025 1431 Restarted (none) Intravenous Rell Rolon MD    1/23/2025 1318 New Bag (none) Intravenous Rell Rolon MD    1/23/2025 1213 Continued by Anesthesia (none) Intravenous Rell Rolon MD    1/23/2025 1208 New Bag (none) Intravenous Nicki Gallegos RN          lidocaine PF (Xylocaine-MPF) 1% injection       Date Action Dose Route User    1/23/2025 1217 Given 40 mg Intravenous Rell Rolon MD          metoclopramide (Reglan) tab 10 mg       Date Action Dose Route User    1/23/2025 1154 Given 10 mg Oral Nicki Gallegos RN          morphINE PF 4 MG/ML injection 4 mg       Date Action Dose Route User    1/23/2025 1543 Given 4 mg Intravenous Ashley Craig RN          ondansetron (Zofran) 4 MG/2ML injection 4 mg       Date Action Dose Route User    1/23/2025 1806 Given 4 mg Intravenous Alena Barone, RN          ondansetron (Zofran) 4 MG/2ML injection       Date Action Dose Route User    1/23/2025 1349 Given 4  mg Intravenous Rell Rolon MD          oxyCODONE immediate release tab 5 mg       Date Action Dose Route User    1/23/2025 2147 Given 5 mg Oral Roberta Kumar RN          oxyCODONE immediate release tab 10 mg       Date Action Dose Route User    1/24/2025 0754 Given 10 mg Oral Roberta Kumar RN    1/24/2025 0345 Given 10 mg Oral Roberta Kumar RN          phenylephrine (Andrae-Synephrine) 10 MG/ML injection       Date Action Dose Route User    1/23/2025 1337 Given 100 mcg Intravenous Rell Rolon MD    1/23/2025 1317 Given 100 mcg Intravenous Rell Rolon MD    1/23/2025 1306 Given 100 mcg Intravenous Rell Rolon MD    1/23/2025 1254 Given 100 mcg Intravenous Rell Rolon MD    1/23/2025 1234 Given 100 mcg Intravenous Rell Rolon MD          propofol (Diprivan) 10 MG/ML injection       Date Action Dose Route User    1/23/2025 1218 Given 180 mg Intravenous Rell Rolon MD          rocuronium (Zemuron) 50 mg/5mL injection       Date Action Dose Route User    1/23/2025 1218 Given 50 mg Intravenous Rell Rolon MD          sugammadex (Bridion) 200 MG/2ML injection       Date Action Dose Route User    1/23/2025 1404 Given 200 mg Intravenous Rell Rolon MD            Vitals (last day)       Date/Time Temp Pulse Resp BP SpO2 Weight O2 Device O2 Flow Rate (L/min) Who    01/24/25 0900 -- 106 -- 133/77 -- -- -- -- AJ    01/24/25 0806 98.4 °F (36.9 °C) 95 16 118/63 96 % -- None (Room air) -- BS    01/24/25 0345 99 °F (37.2 °C) 97 16 119/68 98 % -- None (Room air) -- MW    01/23/25 1619 98.5 °F (36.9 °C) 103 20 156/74 97 % -- None (Room air) -- CW    01/23/25 1610 98.7 °F (37.1 °C) 107 26 155/91 100 % -- Nasal cannula -- LK    01/23/25 1440 -- 99 18 132/86 100 % -- None (Room air) --     01/23/25 1430 97.7 °F (36.5 °C) 100 17 145/82 100 % -- None (Room air) --     01/23/25 1135 98.5 °F (36.9 °C) 129 16 176/89 100 % 161 lb (73 kg) None (Room air) -- GR              Blood Transfusion Record        Product Unit Status Volume Start End            Transfuse RBC     Not assigned Ordered

## 2025-01-24 NOTE — PLAN OF CARE
Patient A/Ox4. Room air. Regular diet. Zofran provided for nausea. Hammond in place. Dilaudid PRN for pain control. Call light within a reach.   Problem: Patient Centered Care  Goal: Patient preferences are identified and integrated in the patient's plan of care  Description: Interventions:  - Provide timely, complete, and accurate information to patient/family  - Incorporate patient and family knowledge, values, beliefs, and cultural backgrounds into the planning and delivery of care  - Encourage patient/family to participate in care and decision-making at the level they choose  - Honor patient and family perspectives and choices  Outcome: Progressing

## 2025-01-24 NOTE — ANESTHESIA POSTPROCEDURE EVALUATION
Patient: Teagan Javier    Procedure Summary       Date: 01/23/25 Room / Location: [facility] MAIN OR  / [facility] MAIN OR    Anesthesia Start: 1213 Anesthesia Stop: 1431    Procedure: Abdominal hysterectomy, bilateral salpingectomy (Abdomen) Diagnosis:       Fibroids      Excessive bleeding in premenopausal period      (Fibroids [D21.9]Excessive bleeding in premenopausal period [N92.4])    Surgeons: Nataliia Sheehan MD Anesthesiologist: Rell Rolon MD    Anesthesia Type: general ASA Status: 2            Anesthesia Type: general    Vitals Value Taken Time   /63 01/24/25 0806   Temp 98.4 °F (36.9 °C) 01/24/25 0806   Pulse 95 01/24/25 0806   Resp 16 01/24/25 0806   SpO2 96 % 01/24/25 0806       EM AN Post Evaluation:   Patient Evaluated in PACU  Patient Participation: complete - patient participated  Level of Consciousness: awake  Pain Score: 2  Pain Management: adequate  Airway Patency:patent  Dental exam unchanged from preop  Yes    Nausea/Vomiting: none  Cardiovascular Status: acceptable  Respiratory Status: acceptable  Postoperative Hydration acceptable      Rell Rolon MD  1/24/2025 8:13 AM

## 2025-01-25 LAB
ANION GAP SERPL CALC-SCNC: 8 MMOL/L (ref 0–18)
BUN BLD-MCNC: 6 MG/DL (ref 9–23)
BUN/CREAT SERPL: 7.3 (ref 10–20)
CALCIUM BLD-MCNC: 8.3 MG/DL (ref 8.7–10.4)
CHLORIDE SERPL-SCNC: 105 MMOL/L (ref 98–112)
CO2 SERPL-SCNC: 28 MMOL/L (ref 21–32)
CREAT BLD-MCNC: 0.82 MG/DL
DEPRECATED RDW RBC AUTO: 51 FL (ref 35.1–46.3)
EGFRCR SERPLBLD CKD-EPI 2021: 92 ML/MIN/1.73M2 (ref 60–?)
ERYTHROCYTE [DISTWIDTH] IN BLOOD BY AUTOMATED COUNT: 17 % (ref 11–15)
GLUCOSE BLD-MCNC: 115 MG/DL (ref 70–99)
HCT VFR BLD AUTO: 22.8 %
HGB BLD-MCNC: 7.2 G/DL
MCH RBC QN AUTO: 26 PG (ref 26–34)
MCHC RBC AUTO-ENTMCNC: 31.6 G/DL (ref 31–37)
MCV RBC AUTO: 82.3 FL
OSMOLALITY SERPL CALC.SUM OF ELEC: 291 MOSM/KG (ref 275–295)
PLATELET # BLD AUTO: 349 10(3)UL (ref 150–450)
POTASSIUM SERPL-SCNC: 3.5 MMOL/L (ref 3.5–5.1)
RBC # BLD AUTO: 2.77 X10(6)UL
SODIUM SERPL-SCNC: 141 MMOL/L (ref 136–145)
WBC # BLD AUTO: 11.3 X10(3) UL (ref 4–11)

## 2025-01-25 PROCEDURE — 99239 HOSP IP/OBS DSCHRG MGMT >30: CPT | Performed by: HOSPITALIST

## 2025-01-25 RX ORDER — HYDROCODONE BITARTRATE AND ACETAMINOPHEN 5; 325 MG/1; MG/1
1 TABLET ORAL EVERY 6 HOURS PRN
Qty: 30 TABLET | Refills: 0 | Status: SHIPPED | OUTPATIENT
Start: 2025-01-25

## 2025-01-25 RX ORDER — ACETAMINOPHEN 325 MG/1
650 TABLET ORAL EVERY 6 HOURS PRN
Status: DISCONTINUED | OUTPATIENT
Start: 2025-01-25 | End: 2025-01-26

## 2025-01-25 RX ORDER — FERROUS SULFATE 325(65) MG
325 TABLET, DELAYED RELEASE (ENTERIC COATED) ORAL
Qty: 30 TABLET | Refills: 0 | Status: SHIPPED | OUTPATIENT
Start: 2025-01-25

## 2025-01-25 RX ORDER — IBUPROFEN 600 MG/1
600 TABLET, FILM COATED ORAL EVERY 6 HOURS PRN
Qty: 30 TABLET | Refills: 0 | Status: SHIPPED | OUTPATIENT
Start: 2025-01-25

## 2025-01-25 RX ORDER — DOCUSATE SODIUM 100 MG/1
100 CAPSULE, LIQUID FILLED ORAL 2 TIMES DAILY
Qty: 30 CAPSULE | Refills: 0 | Status: SHIPPED | OUTPATIENT
Start: 2025-01-25

## 2025-01-25 NOTE — DISCHARGE SUMMARY
[facility]  part of [facility]    Medicine Discharge Summary    Teagan Javier Patient Status:  Inpatient    1980 MRN [id number]   Location [facility] 4W/SW/SE Attending Eren Hemphill MD   Hosp Day # 2 PCP Laya Chiu MD     Date of Admission: 2025 Disposition: Home     Date of Discharge: 25      Admitting Diagnosis: Fibroids [D21.9]  Excessive bleeding in premenopausal period [N92.4]    Hospital Discharge Diagnoses:  mennorhagia    Lace+ Score: 6  59-90 High Risk  29-58 Medium Risk  0-28   Low Risk.    TCM Follow-Up Recommendation:  LACE < 29: Low Risk of readmission after discharge from the hospital. No TCM follow-up needed.      Problem List:   Patient Active Problem List   Diagnosis    Encounter for postoperative care    Intramural and subserous leiomyoma of uterus    Essential hypertension       Reason for Admission:   Fibroid Uterus   Menorrhagia   Essential HTN   Acute blood loss anemia     Physical Exam:   General appearance: alert, appears stated age and cooperative  Pulmonary:  clear to auscultation bilaterally  Cardiovascular: S1, S2 normal, no murmur, click, rub or gallop, regular rate and rhythm  Abdominal: soft, non-tender; bowel sounds normal; no masses,  no organomegaly  Extremities: extremities normal, atraumatic, no cyanosis or edema  Psychiatric: calm      History of Present Illness:   Refer to pre-op h+p    Hospital Course:   Fibroid Uterus   Menorrhagia   S/p total abdominal hysterectomy with B/L Salpingectomy   Path reviewed.   GYNE on consult.   Pain control.   DVT prevention with lovenox   DC quiros   Increase ambulation.   Essential HTN   Norvasc, losartan, hydrochlorothiazide  Acute blood loss anemia   EBL 500mL  Transfuse 1unit PRBC   Hb 6--> 7.7-->7.2  Baseline Hb per EMR 9-10   Given one dose of iv iron prior to dc    Consultations:   Gynecology    Procedures:   S/p total abdominal hysterectomy with B/L Salpingectomy      Complications: n/a    Discharge Condition: Good    Discharge Medications:      Discharge Medications        START taking these medications        Instructions Prescription details   docusate sodium 100 MG Caps  Commonly known as: Colace      Take 1 capsule (100 mg total) by mouth 2 (two) times daily.   Quantity: 30 capsule  Refills: 0     ferrous sulfate 325 (65 FE) MG Tbec      Take 1 tablet (325 mg total) by mouth daily with breakfast.   Quantity: 30 tablet  Refills: 0     HYDROcodone-acetaminophen 5-325 MG Tabs  Commonly known as: Norco      Take 1 tablet by mouth every 6 (six) hours as needed for Pain.   Quantity: 30 tablet  Refills: 0     ibuprofen 600 MG Tabs  Commonly known as: Motrin      Take 1 tablet (600 mg total) by mouth every 6 (six) hours as needed for Pain (for pain).   Quantity: 30 tablet  Refills: 0            CONTINUE taking these medications        Instructions Prescription details   albuterol 108 (90 Base) MCG/ACT Aers  Commonly known as: Ventolin HFA      Inhale 1 puff into the lungs every 6 (six) hours as needed.   Refills: 0     amLODIPine 10 MG Tabs  Commonly known as: Norvasc      daily.   Refills: 0     famotidine 40 MG Tabs  Commonly known as: Pepcid      Take 1 tablet (40 mg total) by mouth daily.   Refills: 0     losartan-hydroCHLOROthiazide 100-25 MG Tabs  Commonly known as: Hyzaar      daily.   Refills: 0     Rimegepant Sulfate 75 MG Tbdp      Take 75 mg by mouth every other day. FOR MIGRAINE HEADACHES   Refills: 0               Where to Get Your Medications        These medications were sent to [facility] -- [address] [phone], [phone]  [address] [place] [address]      Phone: [phone]   docusate sodium 100 MG Caps  ferrous sulfate 325 (65 FE) MG Tbec  HYDROcodone-acetaminophen 5-325 MG Tabs  ibuprofen 600 MG Tabs         Follow up Visits: Follow-up with pcp in 1 week    Follow up Labs: per op md     Other Discharge Instructions:  follow-up with gynecology as instructed    BERT POWERS MD  1/25/2025  4:16 PM    > 35 min

## 2025-01-25 NOTE — DISCHARGE INSTRUCTIONS
Call if fever greater than 101, worsening pain, nausea or vomiting, heavy bleeding.    No lifting greater than 20 lbs for  6 weeks    No sex tampons or douching for 6 weeks    You may drive once you are off pain medicines      It is ok to shower, but do not soak your wound.

## 2025-01-25 NOTE — PLAN OF CARE
Plan of care reviewed with Teagan. Patient had an episode of emesis after eating grits. Was previously tolerating a general diet. IVP zofran given with relief. Patient ambulating in room- encouraged to ambulate in hallway. Abdominal binder in place. Elevated temperature this afternoon. M.D. made aware. Tylenol given. Demonstrating use of incentive spirometer. Safety measures in place and call light within reach.

## 2025-01-25 NOTE — PROGRESS NOTES
Pain well controlled. No heavy bleeding. Ambulating well voiding freely. Tolerating regular diet well. No worsening pain. No flatus. No pain with urination. No cough.     Patient Vitals for the past 24 hrs:   BP Temp Temp src Pulse Resp SpO2   01/25/25 1353 -- (!) 102.5 °F (39.2 °C) Oral -- -- --   01/25/25 1124 134/78 100.2 °F (37.9 °C) Oral 111 17 94 %   01/25/25 0447 116/63 99.4 °F (37.4 °C) Oral 105 16 92 %   01/25/25 0111 118/71 99.8 °F (37.7 °C) Oral 105 18 95 %   01/24/25 2057 134/81 98.6 °F (37 °C) Oral 107 18 98 %   01/24/25 1757 146/83 99 °F (37.2 °C) Oral 103 16 100 %     Repeat T m= 100.7 now    ABD: wound cdi, soft, nontender  EXT: no calf tenderness  Component      Latest Ref Rng 1/25/2025   Glucose      70 - 99 mg/dL 115 (H)    Sodium      136 - 145 mmol/L 141    Potassium      3.5 - 5.1 mmol/L 3.5    Chloride      98 - 112 mmol/L 105    Carbon Dioxide, Total      21.0 - 32.0 mmol/L 28.0    ANION GAP      0 - 18 mmol/L 8    BUN      9 - 23 mg/dL 6 (L)    CREATININE      0.55 - 1.02 mg/dL 0.82    BUN/CREATININE RATIO      10.0 - 20.0  7.3 (L)    CALCIUM      8.7 - 10.4 mg/dL 8.3 (L)    CALCULATED OSMOLALITY      275 - 295 mOsm/kg 291    EGFR      >=60 mL/min/1.73m2 92    WBC      4.0 - 11.0 x10(3) uL 11.3 (H)    RBC      3.80 - 5.30 x10(6)uL 2.77 (L)    Hemoglobin      12.0 - 16.0 g/dL 7.2 (L)    Hematocrit      35.0 - 48.0 % 22.8 (L)    MCV      80.0 - 100.0 fL 82.3    MCH      26.0 - 34.0 pg 26.0    MCHC      31.0 - 37.0 g/dL 31.6    RDW      11.0 - 15.0 % 17.0 (H)    RDW-SD      35.1 - 46.3 fL 51.0 (H)    Platelet Count      150.0 - 450.0 10(3)uL 349.0       Legend:  (H) High  (L) Low  POD#2. Stable.   Elevated temp. No localiziing symptoms. Will give tylenol and observe. Possible due to atelectasis  Anemia - patient with chronic anemia prior to surgery and asymptomatic.

## 2025-01-25 NOTE — PLAN OF CARE
Patient AO x4. POD 2. Dressing CDI to abdomen. Using abd binder. Oxycodone for pain. IVF running. Tolerating diet. Burping but not passing gas. Call light within reach, fall precautions. Plan for home once medically cleared.     Problem: Patient Centered Care  Goal: Patient preferences are identified and integrated in the patient's plan of care  Description: Interventions:  - What would you like us to know as we care for you?   - Provide timely, complete, and accurate information to patient/family  - Incorporate patient and family knowledge, values, beliefs, and cultural backgrounds into the planning and delivery of care  - Encourage patient/family to participate in care and decision-making at the level they choose  - Honor patient and family perspectives and choices  Outcome: Progressing     Problem: Patient/Family Goals  Goal: Patient/Family Long Term Goal  Description: Patient's Long Term Goal:     Interventions:  -   - See additional Care Plan goals for specific interventions  Outcome: Progressing  Goal: Patient/Family Short Term Goal  Description: Patient's Short Term Goal:     Interventions:   -   - See additional Care Plan goals for specific interventions  Outcome: Progressing     Problem: PAIN - ADULT  Goal: Verbalizes/displays adequate comfort level or patient's stated pain goal  Description: INTERVENTIONS:  - Encourage pt to monitor pain and request assistance  - Assess pain using appropriate pain scale  - Administer analgesics based on type and severity of pain and evaluate response  - Implement non-pharmacological measures as appropriate and evaluate response  - Consider cultural and social influences on pain and pain management  - Manage/alleviate anxiety  - Utilize distraction and/or relaxation techniques  - Monitor for opioid side effects  - Notify MD/LIP if interventions unsuccessful or patient reports new pain  - Anticipate increased pain with activity and pre-medicate as appropriate  Outcome:  Progressing     Problem: GASTROINTESTINAL - ADULT  Goal: Minimal or absence of nausea and vomiting  Description: INTERVENTIONS:  - Maintain adequate hydration with IV or PO as ordered and tolerated  - Nasogastric tube to low intermittent suction as ordered  - Evaluate effectiveness of ordered antiemetic medications  - Provide nonpharmacologic comfort measures as appropriate  - Advance diet as tolerated, if ordered  - Obtain nutritional consult as needed  - Evaluate fluid balance  Outcome: Progressing  Goal: Maintains or returns to baseline bowel function  Description: INTERVENTIONS:  - Assess bowel function  - Maintain adequate hydration with IV or PO as ordered and tolerated  - Evaluate effectiveness of GI medications  - Encourage mobilization and activity  - Obtain nutritional consult as needed  - Establish a toileting routine/schedule  - Consider collaborating with pharmacy to review patient's medication profile  Outcome: Progressing     Problem: GENITOURINARY - ADULT  Goal: Absence of urinary retention  Description: INTERVENTIONS:  - Assess patient’s ability to void and empty bladder  - Monitor intake/output and perform bladder scan as needed  - Follow urinary retention protocol/standard of care  - Consider collaborating with pharmacy to review patient's medication profile  - Implement strategies to promote bladder emptying  Outcome: Progressing     Problem: SKIN/TISSUE INTEGRITY - ADULT  Goal: Skin integrity remains intact  Description: INTERVENTIONS  - Assess and document risk factors for pressure ulcer development  - Assess and document skin integrity  - Monitor for areas of redness and/or skin breakdown  - Initiate interventions, skin care algorithm/standards of care as needed  Outcome: Progressing  Goal: Incision(s), wounds(s) or drain site(s) healing without S/S of infection  Description: INTERVENTIONS:  - Assess and document risk factors for pressure ulcer development  - Assess and document skin  integrity  - Assess and document dressing/incision, wound bed, drain sites and surrounding tissue  - Implement wound care per orders  - Initiate isolation precautions as appropriate  - Initiate Pressure Ulcer prevention bundle as indicated  Outcome: Progressing

## 2025-01-25 NOTE — DISCHARGE SUMMARY
[facility]  part of [facility]    Discharge Summary    Teagan Javier Patient Status:  Inpatient    1980 MRN [id number]   Location [facility] 4W/SW/SE Attending Eren Hemphill MD   Hosp Day # 3 PCP Laya Chiu MD     Admit Date: 2025    Discharge Date : 2025    Attending Physician: Eren Hemphill MD    Reason for Admission:  43 y/o with large fibroid uterus, anemia, heavy vaginal bleeding presented for abdominal hysterectomy    Procedures:  Abdominal hysterectomy, bilateral salpingectomy    Hospital Course: She had postoperative anemia and received 1 unit PRBCs. She was discharged to home tolerating regular diet, ambulating well, voiding freely and good pain control    Discharge Diagnoses: The primary encounter diagnosis was Encounter for postoperative care. Diagnoses of Fibroids and Excessive bleeding in premenopausal period were also pertinent to this visit.    Discharged Condition: good    Disposition: home    Patient Instructions:  Follow-up appointment with Eder in 2 weeks.    Nataliia Sheehan MD  2025  2:22 PM

## 2025-01-26 VITALS
BODY MASS INDEX: 30.4 KG/M2 | HEIGHT: 61 IN | OXYGEN SATURATION: 94 % | TEMPERATURE: 100 F | HEART RATE: 93 BPM | WEIGHT: 161 LBS | RESPIRATION RATE: 17 BRPM | DIASTOLIC BLOOD PRESSURE: 90 MMHG | SYSTOLIC BLOOD PRESSURE: 137 MMHG

## 2025-01-26 LAB
ANION GAP SERPL CALC-SCNC: 6 MMOL/L (ref 0–18)
BASOPHILS # BLD AUTO: 0.09 X10(3) UL (ref 0–0.2)
BASOPHILS NFR BLD AUTO: 0.8 %
BILIRUB UR QL: NEGATIVE
BLOOD TYPE BARCODE: 5100
BUN BLD-MCNC: 5 MG/DL (ref 9–23)
BUN/CREAT SERPL: 6.2 (ref 10–20)
CALCIUM BLD-MCNC: 9.1 MG/DL (ref 8.7–10.4)
CHLORIDE SERPL-SCNC: 103 MMOL/L (ref 98–112)
CLARITY UR: CLEAR
CO2 SERPL-SCNC: 32 MMOL/L (ref 21–32)
CREAT BLD-MCNC: 0.81 MG/DL
DEPRECATED RDW RBC AUTO: 51 FL (ref 35.1–46.3)
EGFRCR SERPLBLD CKD-EPI 2021: 93 ML/MIN/1.73M2 (ref 60–?)
EOSINOPHIL # BLD AUTO: 0.81 X10(3) UL (ref 0–0.7)
EOSINOPHIL NFR BLD AUTO: 7.4 %
ERYTHROCYTE [DISTWIDTH] IN BLOOD BY AUTOMATED COUNT: 17.2 % (ref 11–15)
GLUCOSE BLD-MCNC: 137 MG/DL (ref 70–99)
GLUCOSE UR-MCNC: NORMAL MG/DL
HCT VFR BLD AUTO: 25.1 %
HGB BLD-MCNC: 7.6 G/DL
IMM GRANULOCYTES # BLD AUTO: 0.08 X10(3) UL (ref 0–1)
IMM GRANULOCYTES NFR BLD: 0.7 %
KETONES UR-MCNC: NEGATIVE MG/DL
LEUKOCYTE ESTERASE UR QL STRIP.AUTO: NEGATIVE
LYMPHOCYTES # BLD AUTO: 1.41 X10(3) UL (ref 1–4)
LYMPHOCYTES NFR BLD AUTO: 12.9 %
MCH RBC QN AUTO: 24.8 PG (ref 26–34)
MCHC RBC AUTO-ENTMCNC: 30.3 G/DL (ref 31–37)
MCV RBC AUTO: 81.8 FL
MONOCYTES # BLD AUTO: 0.49 X10(3) UL (ref 0.1–1)
MONOCYTES NFR BLD AUTO: 4.5 %
NEUTROPHILS # BLD AUTO: 8.08 X10 (3) UL (ref 1.5–7.7)
NEUTROPHILS # BLD AUTO: 8.08 X10(3) UL (ref 1.5–7.7)
NEUTROPHILS NFR BLD AUTO: 73.7 %
NITRITE UR QL STRIP.AUTO: NEGATIVE
OSMOLALITY SERPL CALC.SUM OF ELEC: 291 MOSM/KG (ref 275–295)
PH UR: 7 [PH] (ref 5–8)
PLATELET # BLD AUTO: 448 10(3)UL (ref 150–450)
POTASSIUM SERPL-SCNC: 3.4 MMOL/L (ref 3.5–5.1)
PROT UR-MCNC: NEGATIVE MG/DL
RBC # BLD AUTO: 3.07 X10(6)UL
SODIUM SERPL-SCNC: 141 MMOL/L (ref 136–145)
SP GR UR STRIP: 1.01 (ref 1–1.03)
UNIT VOLUME: 350 ML
UROBILINOGEN UR STRIP-ACNC: NORMAL
WBC # BLD AUTO: 11 X10(3) UL (ref 4–11)

## 2025-01-26 RX ORDER — POTASSIUM CHLORIDE 1500 MG/1
40 TABLET, EXTENDED RELEASE ORAL ONCE
Status: COMPLETED | OUTPATIENT
Start: 2025-01-26 | End: 2025-01-26

## 2025-01-26 NOTE — PLAN OF CARE
Teagan discharged home with family. Afebrile. Tolerating diet well. Ambulating independently. Pain managed with prn oxycodone. Abdominal binder in place. Peripheral IV removed. Plan to follow up with OBGYN. Incisions clean, dry, and intact.

## 2025-01-26 NOTE — DISCHARGE SUMMARY
[facility] Hospitalist Discharge Summary   Patient ID:  Teagan Javier  [id number]  44 year old  11/5/1980    Admit date: 1/23/2025  Discharge date: 1/26/2025  Primary Care Physician: Laya Chiu MD   Attending Physician: Lucas Berger MD   Consults:   Consultants         Provider   Role Specialty     Soco Heath MD      Consulting Physician HOSPITALIST     Refugio Clay MD      Consulting Physician HOSPITALIST            Discharge Diagnoses:   Intramural and subserous leiomyoma of uterus    Reason for admission  Copied from admission H&P: ***    Hospital Course:  ***    EXAM:   GENERAL: no apparent distress, comfortable  NEURO: A/A Ox3, no focal deficits  RESP: non labored, CTAB/L  CARDIO: Regular, no murmur  ABD: soft, NT, ND  EXTREMITIES: no edema, no calf tenderness    Operative Procedures: Procedure(s) (LRB):  Abdominal hysterectomy, bilateral salpingectomy (N/A)    Discharge Instructions     Medication List        START taking these medications      docusate sodium 100 MG Caps  Commonly known as: Colace  Take 1 capsule (100 mg total) by mouth 2 (two) times daily.  Notes to patient: STOOL SOFTENER     ferrous sulfate 325 (65 FE) MG Tbec  Take 1 tablet (325 mg total) by mouth daily with breakfast.  Notes to patient: IRON SUPPLEMENT     HYDROcodone-acetaminophen 5-325 MG Tabs  Commonly known as: Norco  Take 1 tablet by mouth every 6 (six) hours as needed for Pain.  Notes to patient: FOR PAIN -- DO NOT TAKE AT SAME TIME OF TYLENOL BECAUSE THIS MEDICINE HAS TYLENOL IN IT     ibuprofen 600 MG Tabs  Commonly known as: Motrin  Take 1 tablet (600 mg total) by mouth every 6 (six) hours as needed for Pain (for pain).  Notes to patient: FOR PAIN            CONTINUE taking these medications      albuterol 108 (90 Base) MCG/ACT Aers  Commonly known as: Ventolin HFA     amLODIPine 10 MG Tabs  Commonly known as: Norvasc     famotidine 40 MG Tabs  Commonly known as: Pepcid     losartan-hydroCHLOROthiazide  100-25 MG Tabs  Commonly known as: Hyzaar     Rimegepant Sulfate 75 MG Tbdp               Where to Get Your Medications        These medications were sent to [facility] -- [place] [address] [address] [facility] [phone], [phone]  [facility] [address] [facility] [address]      Phone: [phone]   docusate sodium 100 MG Caps  ferrous sulfate 325 (65 FE) MG Tbec  HYDROcodone-acetaminophen 5-325 MG Tabs  ibuprofen 600 MG Tabs         Activity: {discharge activity:57571}  Diet: {diet:29452}  Wound Care: NA  Code Status: No Order        Discharge Instructions         Call if fever greater than 101, worsening pain, nausea or vomiting, heavy bleeding.    No lifting greater than 20 lbs for  6 weeks    No sex tampons or douching for 6 weeks    You may drive once you are off pain medicines      It is ok to shower, but do not soak your wound.          Important follow up:   Follow-up Information       Nataliia Sheehan MD Follow up in 2 week(s).    Specialty: OBSTETRICS & GYNECOLOGY  Contact information:  [address] [facility] [address]  [facility] [address] [phone]               Laya Chiu MD Follow up in 1 week(s).    Specialty: Family Practice  Contact information:  [address] [place] [address] [phone]                             -PCP in [] within 7 days [] within 14 days [] other     Disposition: {disposition:24957}  Discharged Condition: {condition:09007}    Hospital Discharge Diagnoses: {Enter hospital discharge diagnoses here (please select the wildcards and then replace with free text; this allows us to save this data discretely for reporting purposes:5961::\"***\"}    Lace+ Score: 13  59-90 High Risk  29-58 Medium Risk  0-28   Low Risk.    TCM Follow-Up Recommendation:  {Care Managers will evaluate the need for follow-up for all patients ages 50+, and high/moderate risk patients ages 25-49. Low risk patients (LACE < 29) will only be evaluated if the \"Still recommend for TCM  follow-up\" option is selected from this list.:7396}            Total Time Coordinating Care: Greater than 30 minutes    Patient had opportunity to ask questions, state understanding, and agree with therapeutic plan as outlined    Lucas Berger MD  Hospitalist  1/26/2025

## 2025-01-26 NOTE — PROGRESS NOTES
Pain well controlled. No vaginal bleeding. Passing gas. Tolerating regular diet. Urinating well.     Patient Vitals for the past 24 hrs:   BP Temp Temp src Pulse Resp SpO2   01/26/25 0500 126/72 99 °F (37.2 °C) Oral 97 14 93 %   01/25/25 1913 121/82 98.4 °F (36.9 °C) Oral 92 16 97 %   01/25/25 1549 -- 98.8 °F (37.1 °C) Oral -- -- --   01/25/25 1448 -- (!) 100.7 °F (38.2 °C) Oral 107 -- --   01/25/25 1353 -- (!) 102.5 °F (39.2 °C) Oral -- -- --   01/25/25 1124 134/78 100.2 °F (37.9 °C) Oral 111 17 94 %   ABD: wound cdi, soft, nontender  EXT: no calf tenderness    POD#3 stable  Fever resolved with no localizing symptoms  Anemia: asymptomatic. Home with iron.   DW her discharge instructions.

## 2025-01-26 NOTE — PLAN OF CARE
No acute changes overnight. Pain being managed with PRN oxy. No complaints of nausea/vomiting. Voiding freely. Afebrile overnight.     Problem: Patient Centered Care  Goal: Patient preferences are identified and integrated in the patient's plan of care  Description: Interventions:  - What would you like us to know as we care for you?   - Provide timely, complete, and accurate information to patient/family  - Incorporate patient and family knowledge, values, beliefs, and cultural backgrounds into the planning and delivery of care  - Encourage patient/family to participate in care and decision-making at the level they choose  - Honor patient and family perspectives and choices  Outcome: Progressing     Problem: Patient/Family Goals  Goal: Patient/Family Long Term Goal  Description: Patient's Long Term Goal: to go home     Interventions:  - Pain management   - Tolerate diet advancement   - Ambulate   - See additional Care Plan goals for specific interventions  Outcome: Progressing  Goal: Patient/Family Short Term Goal  Description: Patient's Short Term Goal: for my pain to be a 5 or less     Interventions:   - Pain medications as needed   - Nonpharmacologic interventions   - See additional Care Plan goals for specific interventions  Outcome: Progressing     Problem: PAIN - ADULT  Goal: Verbalizes/displays adequate comfort level or patient's stated pain goal  Description: INTERVENTIONS:  - Encourage pt to monitor pain and request assistance  - Assess pain using appropriate pain scale  - Administer analgesics based on type and severity of pain and evaluate response  - Implement non-pharmacological measures as appropriate and evaluate response  - Consider cultural and social influences on pain and pain management  - Manage/alleviate anxiety  - Utilize distraction and/or relaxation techniques  - Monitor for opioid side effects  - Notify MD/LIP if interventions unsuccessful or patient reports new pain  - Anticipate  increased pain with activity and pre-medicate as appropriate  Outcome: Progressing     Problem: GASTROINTESTINAL - ADULT  Goal: Minimal or absence of nausea and vomiting  Description: INTERVENTIONS:  - Maintain adequate hydration with IV or PO as ordered and tolerated  - Nasogastric tube to low intermittent suction as ordered  - Evaluate effectiveness of ordered antiemetic medications  - Provide nonpharmacologic comfort measures as appropriate  - Advance diet as tolerated, if ordered  - Obtain nutritional consult as needed  - Evaluate fluid balance  Outcome: Progressing  Goal: Maintains or returns to baseline bowel function  Description: INTERVENTIONS:  - Assess bowel function  - Maintain adequate hydration with IV or PO as ordered and tolerated  - Evaluate effectiveness of GI medications  - Encourage mobilization and activity  - Obtain nutritional consult as needed  - Establish a toileting routine/schedule  - Consider collaborating with pharmacy to review patient's medication profile  Outcome: Progressing     Problem: GENITOURINARY - ADULT  Goal: Absence of urinary retention  Description: INTERVENTIONS:  - Assess patient’s ability to void and empty bladder  - Monitor intake/output and perform bladder scan as needed  - Follow urinary retention protocol/standard of care  - Consider collaborating with pharmacy to review patient's medication profile  - Implement strategies to promote bladder emptying  Outcome: Progressing     Problem: SKIN/TISSUE INTEGRITY - ADULT  Goal: Skin integrity remains intact  Description: INTERVENTIONS  - Assess and document risk factors for pressure ulcer development  - Assess and document skin integrity  - Monitor for areas of redness and/or skin breakdown  - Initiate interventions, skin care algorithm/standards of care as needed  Outcome: Progressing  Goal: Incision(s), wounds(s) or drain site(s) healing without S/S of infection  Description: INTERVENTIONS:  - Assess and document risk  factors for pressure ulcer development  - Assess and document skin integrity  - Assess and document dressing/incision, wound bed, drain sites and surrounding tissue  - Implement wound care per orders  - Initiate isolation precautions as appropriate  - Initiate Pressure Ulcer prevention bundle as indicated  Outcome: Progressing

## 2025-01-31 NOTE — PAYOR COMM NOTE
--------------  DISCHARGE REVIEW    Payor: [employer] [facility]/HMO/POS/EPO  Subscriber #:  [id number]  Authorization Number: [id number]    Admit date: 25  Admit time:  11:29 AM  Discharge Date: 2025  4:52 PM     Admitting Physician: Nataliia Sheehan MD  Attending Physician:  No att. providers found  Primary Care Physician: Laya Chiu MD          Discharge Summary Notes        Discharge Summary signed by Nataliia Sheehan MD at 2025  7:29 AM       Author: Nataliia Sheehan MD Specialty: OBSTETRICS & GYNECOLOGY Author Type: Physician    Filed: 2025  7:29 AM Date of Service: 2025  2:22 PM Status: Addendum    : Nataliia Sheehan MD (Physician)    Related Notes: Original Note by Nataliia Sheehan MD (Physician) filed at 2025  2:24 PM           [facility]  part of [facility]    Discharge Summary    Teagan Javier Patient Status:  Inpatient    1980 MRN [id number]   Location [facility] 4W/SW/SE Attending Eren Hemphill MD   Hosp Day # 3 PCP Laya Chiu MD     Admit Date: 2025    Discharge Date : 2025    Attending Physician: Eren Hemphill MD    Reason for Admission:  45 y/o with large fibroid uterus, anemia, heavy vaginal bleeding presented for abdominal hysterectomy    Procedures:  Abdominal hysterectomy, bilateral salpingectomy    Hospital Course: She had postoperative anemia and received 1 unit PRBCs. She was discharged to home tolerating regular diet, ambulating well, voiding freely and good pain control    Discharge Diagnoses: The primary encounter diagnosis was Encounter for postoperative care. Diagnoses of Fibroids and Excessive bleeding in premenopausal period were also pertinent to this visit.    Discharged Condition: good    Disposition: home    Patient Instructions:  Follow-up appointment with Eder in 2 weeks.    Nataliia Sheehan  MD  1/25/2025  2:22 PM    Electronically signed by Nataliia Sheehan MD on 1/26/2025  7:29 AM         REVIEWER COMMENTS

## 2025-02-04 ENCOUNTER — OFFICE VISIT (OUTPATIENT)
Dept: OBGYN CLINIC | Facility: CLINIC | Age: 45
End: 2025-02-04
Payer: COMMERCIAL

## 2025-02-04 VITALS
BODY MASS INDEX: 30.21 KG/M2 | HEIGHT: 61 IN | WEIGHT: 160 LBS | SYSTOLIC BLOOD PRESSURE: 134 MMHG | DIASTOLIC BLOOD PRESSURE: 70 MMHG

## 2025-02-04 DIAGNOSIS — N76.0 VAGINITIS AND VULVOVAGINITIS: ICD-10-CM

## 2025-02-04 DIAGNOSIS — Z48.89 ENCOUNTER FOR POSTOPERATIVE CARE: ICD-10-CM

## 2025-02-04 DIAGNOSIS — Z09 POSTOP CHECK: Primary | ICD-10-CM

## 2025-02-04 LAB
BILIRUBIN: NEGATIVE
GLUCOSE (URINE DIPSTICK): NEGATIVE MG/DL
KETONES (URINE DIPSTICK): NEGATIVE MG/DL
MULTISTIX LOT#: ABNORMAL NUMERIC
NITRITE, URINE: NEGATIVE
PH, URINE: 5.5 (ref 4.5–8)
PROTEIN (URINE DIPSTICK): NEGATIVE MG/DL
SPECIFIC GRAVITY: 1.02 (ref 1–1.03)
URINE-COLOR: YELLOW
UROBILINOGEN,SEMI-QN: 0.2 MG/DL (ref 0–1.9)

## 2025-02-04 PROCEDURE — 87086 URINE CULTURE/COLONY COUNT: CPT | Performed by: OBSTETRICS & GYNECOLOGY

## 2025-02-04 PROCEDURE — 81002 URINALYSIS NONAUTO W/O SCOPE: CPT | Performed by: OBSTETRICS & GYNECOLOGY

## 2025-02-04 PROCEDURE — 99024 POSTOP FOLLOW-UP VISIT: CPT | Performed by: OBSTETRICS & GYNECOLOGY

## 2025-02-04 RX ORDER — CEPHALEXIN 500 MG/1
500 CAPSULE ORAL 4 TIMES DAILY
Qty: 40 CAPSULE | Refills: 0 | Status: SHIPPED | OUTPATIENT
Start: 2025-02-04

## 2025-02-04 NOTE — PROGRESS NOTES
CC: Patient is here for 2 W postop visit    HPI: Patient is a 44 year old  for 2 W postop visit after abdominal hys.    Had one episode of leakage of urine 5 days ago. Since then she has been able to control her urine. She is c/o brownish discharge with odor, cloudy urine. + c/o fatigue. No fever, no chills    Having regular BM, 2 - 3 x per day  Pain is moderate, using meds with some good relief.   She is also c/o L sided LA cramping. Over L side of incision.       Patient's last menstrual period was 2024.    OB History    Para Term  AB Living   1       1     SAB IAB Ectopic Multiple Live Births                  # Outcome Date GA Lbr Zeeshan/2nd Weight Sex Type Anes PTL Lv   1 AB                GYN hx:       LPS:    Past Medical History:    Esophageal reflux    Fibroids    High blood pressure    Hypertension    Migraine    Migraines    Visual impairment     Past Surgical History:   Procedure Laterality Date    D & c      EAB    Total abdominal hysterectomy N/A 2025    Procedure: Abdominal hysterectomy, bilateral salpingectomy;  Surgeon: Nataliia Sheehan MD;  Location: [facility] MAIN OR     Allergies[1]  Family History   Problem Relation Age of Onset    No Known Problems Mother     No Known Problems Father     No Known Problems Sister     High Blood Pressure Maternal Grandmother     Heart Disease Maternal Grandmother     Kidney Disease Maternal Grandfather         dialysis    High Blood Pressure Maternal Grandfather     Breast Cancer Neg     Ovarian Cancer Neg     Uterine Cancer Neg     Colon Cancer Neg      Social History     Socioeconomic History    Marital status: Single     Spouse name: Not on file    Number of children: Not on file    Years of education: Not on file    Highest education level: Not on file   Occupational History    Occupation: works from home as    Tobacco Use    Smoking status: Never    Smokeless tobacco: Never   Vaping Use    Vaping  status: Never Used   Substance and Sexual Activity    Alcohol use: Not Currently     Comment: VERY OCCASIONALLY    Drug use: Not Currently     Comment: CBD GUMMIES RARELY    Sexual activity: Yes     Partners: Male     Birth control/protection: Mirena     Comment: MIrena IUD 2014/2015   Other Topics Concern    Not on file   Social History Narrative    Lives with mother and GM, feels safe    No hx of abuse     Social Drivers of Health     Food Insecurity: No Food Insecurity (1/23/2025)    Food Insecurity     Food Insecurity: Never true   Transportation Needs: No Transportation Needs (1/23/2025)    Transportation Needs     Lack of Transportation: No     Car Seat: Not on file   Stress: Not on file   Housing Stability: Low Risk  (1/23/2025)    Housing Stability     Housing Instability: No     Housing Instability Emergency: Not on file     Crib or Bassinette: Not on file       Medications reviewed. See active list.     /70   Ht 61\"   Wt 160 lb (72.6 kg)   LMP 12/31/2024   BMI 30.23 kg/m²       Exam:   GENERAL: well developed, well nourished, in no apparent distress  ABDOMEN: Soft, non distended; non tender, no masses.  Liver and spleen non-tender, no enlargement. No palpable hernias, wound cdi  GYNE/:  External Genitalia: Normal appearing, no lesions, normal hair distribution   Urethral meatus appear wnl, no abnormal discharge or lesions noted.   Bladder: well supported, urethra wnl, no palpable tenderness or masses, no discharge  Vagina: normal pink mucosa, no lesions, thin gray discharge          A/P: Patient is 44 year old female     1. Postop check    2. Encounter for postoperative care    3. Vaginitis and vulvovaginitis  - cephALEXin (KEFLEX) 500 MG Oral Cap; Take 1 capsule (500 mg total) by mouth 4 (four) times daily.  Dispense: 40 capsule; Refill: 0    Recommend FU 2 W  DW pt to clal if recurrent leakage of urine  DW her to call if any worsening of her symptoms.     Nataliia Sheehan MD                 [1] No Known Allergies

## 2025-02-07 NOTE — TELEPHONE ENCOUNTER
Patient called to confirm that completed forms were faxed to [place] informed patient they were and have confirmation.  Per patient will re-fax and patient will also email copies to.  Faxed again to [employer] fax #[phone] waiting on confirmation. FAX SUCCESSFUL AND SCANNED INTO THIS ENCOUNTER

## 2025-02-13 ENCOUNTER — PATIENT MESSAGE (OUTPATIENT)
Dept: OBGYN CLINIC | Facility: CLINIC | Age: 45
End: 2025-02-13

## 2025-02-13 NOTE — TELEPHONE ENCOUNTER
Patient sent in [employer] message stating that her disability is being denied at this time because there is not enough information on questions #14 and #21. Will let rep know who is working on revisions and place in reps box for revision.

## 2025-02-25 ENCOUNTER — OFFICE VISIT (OUTPATIENT)
Dept: OBGYN CLINIC | Facility: CLINIC | Age: 45
End: 2025-02-25
Payer: COMMERCIAL

## 2025-02-25 VITALS
HEIGHT: 61 IN | SYSTOLIC BLOOD PRESSURE: 124 MMHG | WEIGHT: 160 LBS | BODY MASS INDEX: 30.21 KG/M2 | DIASTOLIC BLOOD PRESSURE: 76 MMHG

## 2025-02-25 DIAGNOSIS — Z09 POSTOP CHECK: Primary | ICD-10-CM

## 2025-02-25 PROCEDURE — 99024 POSTOP FOLLOW-UP VISIT: CPT | Performed by: OBSTETRICS & GYNECOLOGY

## 2025-02-25 NOTE — PROGRESS NOTES
CC: Patient is here for postop check after abdominal hysterectomy    HPI: Patient is a 44 year old  for above. Having some pain on L side of her abcomen. No feer, chills, urinating well. No incontinence. No vaginal bleeding.       Patient's last menstrual period was 2024.    OB History    Para Term  AB Living   1       1     SAB IAB Ectopic Multiple Live Births                  # Outcome Date GA Lbr Zeeshan/2nd Weight Sex Type Anes PTL Lv   1 AB                GYN hx:       LPS:        Past Medical History:    Esophageal reflux    Fibroids    High blood pressure    Hypertension    Migraine    Migraines    Visual impairment     Past Surgical History:   Procedure Laterality Date    D & c      EAB    Total abdominal hysterectomy N/A 2025    Procedure: Abdominal hysterectomy, bilateral salpingectomy;  Surgeon: Nataliia Sheehan MD;  [facility] MAIN OR     Allergies[1]  Family History   Problem Relation Age of Onset    No Known Problems Mother     No Known Problems Father     No Known Problems Sister     High Blood Pressure Maternal Grandmother     Heart Disease Maternal Grandmother     Kidney Disease Maternal Grandfather         dialysis    High Blood Pressure Maternal Grandfather     Breast Cancer Neg     Ovarian Cancer Neg     Uterine Cancer Neg     Colon Cancer Neg      Social History     Socioeconomic History    Marital status: Single     Spouse name: Not on file    Number of children: Not on file    Years of education: Not on file    Highest education level: Not on file   Occupational History    Occupation: works from home as    Tobacco Use    Smoking status: Never    Smokeless tobacco: Never   Vaping Use    Vaping status: Never Used   Substance and Sexual Activity    Alcohol use: Not Currently     Comment: VERY OCCASIONALLY    Drug use: Not Currently     Comment: CBD GUMMIES RARELY    Sexual activity: Yes     Partners: Male     Birth  control/protection: Mirena     Comment: MIrena IUD 2014/2015   Other Topics Concern    Not on file   Social History Narrative    Lives with mother and GM, feels safe    No hx of abuse     Social Drivers of Health     Food Insecurity: No Food Insecurity (1/23/2025)    Food Insecurity     Food Insecurity: Never true   Transportation Needs: No Transportation Needs (1/23/2025)    Transportation Needs     Lack of Transportation: No     Car Seat: Not on file   Stress: Not on file   Housing Stability: Low Risk  (1/23/2025)    Housing Stability     Housing Instability: No     Housing Instability Emergency: Not on file     Crib or Bassinette: Not on file       Medications reviewed. See active list.     /76   Ht 61\"   Wt 160 lb (72.6 kg)   LMP 12/31/2024   BMI 30.23 kg/m²       Exam:   GENERAL: well developed, well nourished, in no apparent distress  ABDOMEN: Soft, non distended; non tender, no masses.  Liver and spleen non-tender, no enlargement. No palpable hernias, wound cdi, slight tenderness at L side of the incision.   GYNE/:  External Genitalia: Normal appearing, no lesions, normal hair distribution   Urethral meatus appear wnl, no abnormal discharge or lesions noted.   Bladder: well supported, urethra wnl, no palpable tenderness or masses, no discharge  Vagina: normal pink mucosa, no lesions, normal clear discharge.   Uterus: midline, mobile, non-tender, firm and smooth, cuff well healed  Adnexa: non tender, no palpable masses, normal size  Anus:  No lesions or visible hemorrhoids        A/P: Patient is 44 year old female     1. Postop check    Doing well.   DW pt L sided LAP is normal postop change.   FEU as needed.     Nataliia Sheehan MD                [1] No Known Allergies

## 2025-02-28 ENCOUNTER — TELEPHONE (OUTPATIENT)
Dept: OBGYN CLINIC | Facility: CLINIC | Age: 45
End: 2025-02-28

## 2025-03-05 NOTE — TELEPHONE ENCOUNTER
Dr. Sheehan,    *ACKNOWLEDGE BUTTON HAS BEEN MOVED TO THE TOP RIGHT RIBBON*    Please sign off on form if you agree to: Return to work     -Signature page will be the first page scanned  -From your Inbasket, Highlight the patient and click Chart   -Double click the 1/16/2025 Forms Completion telephone encounter  -Scroll down to the Media section   -Click the blue Hyperlink: Return to work Dr. Sheehan  -Click Acknowledge located in the top right ribbon/menu   -Drag the mouse into the blank space of the document and a + sign will appear. Left click to   electronically sign the document.  -Once signed, simply exit out of the screen and you signature will be saved.     Thank you,    Sangeeta     English

## 2025-03-05 NOTE — TELEPHONE ENCOUNTER
Patient called stated she submitted Return to work forms on 2/28/25 via [employer], forms were not routed to forms dept. Patient requesting to expedite forms as she is Return to work 3/7/25. STAT rep informed.

## 2025-03-06 NOTE — TELEPHONE ENCOUNTER
Return to work form completed and efaxed to [employer] fax [phone] and copy sent to patient via [employer].

## (undated) DEVICE — TRAY CATH FOLEY 16FR INCLUDE BARDX IC COMPLT CARE

## (undated) DEVICE — SUT VCRL 4-0 27IN KS ABSRB UD L65MM REV CUT

## (undated) DEVICE — 3M(TM) MEDIPORE(TM) H SOFT CLOTH TAPE 2866: Brand: 3M™ MEDIPORE™

## (undated) DEVICE — GLOVE SUR 7 SENSICARE PI MIC PIP CRM PWD F

## (undated) DEVICE — SUT PLN GUT 2-0 27IN CT ABSRB TAN YELLOWISH 4

## (undated) DEVICE — SUT COAT VCRL 0 27IN CT-1 ABSRB VLT 36MM 1/2

## (undated) DEVICE — SOLUTION IRRIG 1000ML ST H2O AQUALITE PLAS

## (undated) DEVICE — WOUND RETRACTOR AND PROTECTOR: Brand: ALEXIS O WOUND PROTECTOR-RETRACTOR

## (undated) DEVICE — SUT VCRL 0 L18IN ABSRB VLT POLYGLACTIN 910

## (undated) DEVICE — SKIN PREP TRAY 4 COMPARTM TRAY: Brand: MEDLINE INDUSTRIES, INC.

## (undated) DEVICE — SPONGE 4X4 10PK

## (undated) DEVICE — UNDERPANTS MAT 2XL FOR 24-64IN WAIST PUR

## (undated) DEVICE — SPONGE LAP 18X18IN WHT COT 4 PLY FLD STRUNG

## (undated) DEVICE — ADHESIVE SKIN TOP FOR WND CLSR DERMBND ADV

## (undated) DEVICE — ANTIBACTERIAL VIOLET BRAIDED (POLYGLACTIN 910), SYNTHETIC ABSORBABLE SUTURE: Brand: COATED VICRYL

## (undated) DEVICE — DRAPE,T,LAPARO,TRANS,STERILE: Brand: MEDLINE

## (undated) DEVICE — SOLUTION IRRIG 1000ML 0.9% NACL USP BTL

## (undated) DEVICE — 3M™ TEGADERM™ TRANSPARENT FILM DRESSING FRAME STYLE, 1626W, 4 IN X 4-3/4 IN (10 CM X 12 CM), 50/CT 4CT/CASE: Brand: 3M™ TEGADERM™

## (undated) DEVICE — POWDER HEMSTAT 3GM OXIDIZED REGENERATED CELOS

## (undated) DEVICE — GLOVE SUR 6.5 SENSICARE PI MIC PIP CRM PWD F

## (undated) DEVICE — LAPAROTOMY: Brand: MEDLINE INDUSTRIES, INC.

## (undated) NOTE — LETTER
[facility] 4W/SW/SE  [address]  [facility] [address]  [phone]    Blood Transfusion Consent    In the course of your treatment, it may become necessary to administer a transfusion of blood or blood components. This form provides basic information concerning this procedure and, if signed by you, authorizes its administration. By signing this form, you agree that all of your questions about the administration of blood or blood products have been answered by the ordering medical professional or designee.    Description of Procedure  Blood is introduced into one of your veins, commonly in the arm, using a sterilized disposable needle. The amount of blood transfused, and whether the transfusion will be of blood or blood components is a judgement the physician will make based on your particular needs.    Risks  The transfusion is a common procedure of low risk.  MINOR AND TEMPORARY REACTIONS ARE NOT UNCOMMON, including a slight bruise, swelling or local reaction in the area where the needle pierces your skin, or a nonserious reaction to the transfused material itself, including headache, fever or mild skin reaction, such as rash.  Serious reactions are possible, though very unlikely, and include severe allergic reaction (shock) and destruction (hemolysis) of transfused blood cells.  Infectious diseases which are known to be transmitted by blood transfusion include certain types of viral Hepatitis(liver infection from a virus), Human Immunodeficiency Virus (HIV-1,2) infection, a viral infection known to cause Acquired Immunodeficiency Syndrome (AIDS), as well as certain other bacterial, viral, and parasitic diseases. While a minimal risk of acquiring an infectious disease from transfused blood exists, in accordance with the Federal and State law, all due care has been taken in donor selection and testing to avoid transmission of disease.    Alternatives  If loss of blood poses serious threats during your  treatment, THERE IS NO EFFECTIVE ALTERNATIVE TO BLOOD TRANSFUSION. However, if you have any further questions on this matter, your provider will fully explain the alternatives to you if it has not already been done.    I, ______________________________, have read/had read to me the above. I understand the matters bearing on the decision whether or not to authorize a transfusion of blood or blood components. I have no questions which have not been answered to my full satisfaction. I hereby consent to such transfusion as my physician may deem necessary or advisable in the course of my treatment.    ______________________________________________                    ___________________________  (Signature of Patient or Responsible party in case of minor,                 (Printed Name of Patient or incompetent, or unconscious patient)              Responsible Party)    ___________________________               _____________________  (Relationship to Patient if not self)                                    (Date and Time)    __________________________                                                           ______________________              (Signature of Witness)               (Printed Name of Witness)     Language line ()    Telephone/Verbal/Video Consent    __________________________                     ____________________  (Signature of 2nd Witness           (Printed Name of 2nd  Telephone/Verbal/Video Consent)           Witness)    Patient Name: Teagan Javier     : 1980                 Printed: 2025     Medical Record #: [id number]      Rev: 2023

## (undated) NOTE — MR AVS SNAPSHOT
After Visit Summary   2/29/2024    Teagan Javier   MRN: [id number]           Visit Information     Date & Time  2/29/2024  3:30 PM Provider  Kristal Soto MD [facility] - OB/GYN Dept. Phone  [phone]      Your Vitals Were  Most recent update: 2/29/2024  4:05 PM    BP   120/78    Ht   61\"    Wt   160 lb 11.2 oz    LMP   01/14/2023    BMI   30.36 kg/m²         Allergies as of 2/29/2024  Review status set to Review Complete on 2/29/2024   No Known Allergies     Your Current Medications        Dosage    albuterol 108 (90 Base) MCG/ACT Inhalation Aero Soln Inhale 1 puff into the lungs As Directed.    Levonorgestrel 20 MCG/DAY Intrauterine IUD 1 Intra Uterine Device by Intrauterine route one time.    SUMAtriptan Succinate 100 MG Oral Tab Take 1 tablet (100 mg total) by mouth every 2 (two) hours as needed for Migraine.    tranexamic acid (LYSTEDA) 650 MG Oral Tab Take 650 mg x 2 tablets every 8 hours until bleeding stops, no greater that 5 days    butalbital-acetaminophen-caffeine -40 MG Oral Tab TAKE 1 TABLET BY MOUTH EVERY 4 HOURS AS NEEDED FOR PAIN (headaches)      Diagnoses for This Visit    Screening for cervical cancer   [529871]  -  Primary  Women's annual routine gynecological examination   [7771771]    Fibroids   [410148]    Excessive bleeding in premenopausal period   [059432]    History of use of contraceptive intrauterine device (IUD)   [7095955]    Breast cancer screening by mammogram   [6797006]    Urinary symptom or sign   [1518075]             We Ordered the Following     Normal Orders This Visit    Hpv Dna  High Risk , Thin Prep Collect [PIS2434 CUSTOM]     THINPREP PAP SMEAR ONLY [YEM3997 CUSTOM]     ThinPrep PAP Smear [OCV5500 CUSTOM]     Urine Culture, Routine [8291066 CUSTOM]     Urine Dip in office [99634] [04638 CPT(R)]     Future Labs/Procedures Expected by Expires    CBC W Differential W Platelet [[id number] CUSTOM]   2/29/2024 (Approximate) 2/28/2025    Hpv Dna  High Risk , Thin Prep Collect [[id number] CUSTOM]  2/29/2024 2/28/2025    JASON DULCE 2D+3D SCREENING BILAT (CPT=77067/74998) [COMBO CPT(R)]  2/29/2024 (Approximate) 2/28/2025    ThinPrep PAP Smear [[id number] CUSTOM]  2/29/2024 2/28/2025    Urine Culture, Routine [[id number] CUSTOM]  2/29/2024 (Approximate) 2/28/2025      Imaging Scheduling Instructions     Around February 29, 2024   Imaging:   JASON DULCE 2D+3D SCREENING BILAT (CPT=77067/53278)    Instructions: To schedule a test at any [facility], call Central Scheduling at [phone], Monday through Friday between 7:30am to 6pm and on Saturday between 8am and 1pm.      [facility] ([facility])        [address]    [place], [place]          It is the patient's responsibility to check with and follow their insurance company's guidelines for prior authorization for this test.  You may be held responsible for payment in full if proper authorization is not acquired.  Please contact the Patient Business Office at [phone] if you have   any questions related to insurance coverage.  Thank you.    Children: Children under the age of 12 must have another adult caregiver with them. Please do not bring your child/children without a caregiver. Because of the highly sensitive equipment and privacy of all our patients, children will not be permitted in the exam rooms, unless otherwise noted and in   accordance with departmental policy.                  Did you know that [facility] primary care physicians now offer Video Visits through [employer] for adult patients for a variety of conditions such as allergies, back pain and cold symptoms? Skip the drive and waiting room and online chat with a doctor face-to-face using your web-cam enabled computer or mobile device wherever you are. Video Visits cost $50 and can be paid hassle-free using a credit, debit, or health savings card.  Not  active on [employer]? Ask us how to get signed up today!          If you receive a survey from [employer], please take a few minutes to complete it and provide feedback. We strive to deliver the best patient experience and are looking for ways to make improvements. Your feedback will help us do so. For more information on [employer], please visit [email or web address]           No text in SmartText           No text in SmartText

## (undated) NOTE — LETTER
Teagan Javier, :1980    CONSENT FOR PROCEDURE/SEDATION    1. I authorize the performance upon Teagan Javier  the following: Intrauterine Device Removal    2. I authorize Dr. Kristal Soto MD (and whomever is designated as the doctor’s assistant), to perform the above-mentioned procedures.    3. If any unforeseen conditions arise during this procedure calling for additional  procedures, operations, or medications (including anesthesia and blood transfusion), I further request and authorize the doctor to do whatever he/she deems advisable in my interest.    4. I consent to the taking and reproduction of any photographs in the course of this procedure for professional purposes.    5. I consent to the administration of such sedation as may be considered necessary or advisable by the physician responsible for this service, with the exception of ______________________________________________________    6. I have been informed by my doctor of the nature and purpose of this procedure sedation, possible alternative methods of treatment, risk involved and possible complications.    Signature of Patient:_______________________________________________    Signature of person authorized to consent for patient:  _______________________________________________________________    Relationship to patient: ____________________________________________    Witness: _________________________________________ Date:___________     Physician Signature: _______________________________ Date:___________